# Patient Record
Sex: FEMALE | Race: WHITE | Employment: OTHER | URBAN - NONMETROPOLITAN AREA
[De-identification: names, ages, dates, MRNs, and addresses within clinical notes are randomized per-mention and may not be internally consistent; named-entity substitution may affect disease eponyms.]

---

## 2024-01-30 ENCOUNTER — HOSPITAL ENCOUNTER (OUTPATIENT)
Dept: MRI IMAGING | Age: 69
Discharge: HOME | End: 2024-01-30
Attending: RADIOLOGY

## 2024-01-30 DIAGNOSIS — Z00.6 ENCOUNTER FOR EXAMINATION FOR NORMAL COMPARISON OR CONTROL IN CLINICAL RESEARCH PROGRAM: ICD-10-CM

## 2024-05-10 ENCOUNTER — HOSPITAL ENCOUNTER (OUTPATIENT)
Dept: CT IMAGING | Age: 69
Discharge: HOME | End: 2024-05-10
Attending: RADIOLOGY

## 2024-05-10 DIAGNOSIS — Z00.6 ENCOUNTER FOR EXAMINATION FOR NORMAL COMPARISON OR CONTROL IN CLINICAL RESEARCH PROGRAM: ICD-10-CM

## 2024-05-11 ENCOUNTER — HOSPITAL ENCOUNTER (OUTPATIENT)
Dept: CT IMAGING | Age: 69
Discharge: HOME | End: 2024-05-11
Attending: RADIOLOGY

## 2024-05-11 ENCOUNTER — HOSPITAL ENCOUNTER (OUTPATIENT)
Dept: ULTRASOUND IMAGING | Age: 69
Discharge: HOME | End: 2024-05-11
Attending: RADIOLOGY

## 2024-05-11 DIAGNOSIS — Z00.6 ENCOUNTER FOR EXAMINATION FOR NORMAL COMPARISON OR CONTROL IN CLINICAL RESEARCH PROGRAM: ICD-10-CM

## 2024-05-15 ENCOUNTER — OFFICE VISIT (OUTPATIENT)
Dept: NEUROLOGY | Age: 69
End: 2024-05-15
Payer: MEDICARE

## 2024-05-15 ENCOUNTER — TELEPHONE (OUTPATIENT)
Dept: NEUROLOGY | Age: 69
End: 2024-05-15

## 2024-05-15 ENCOUNTER — OFFICE VISIT (OUTPATIENT)
Dept: NEUROSURGERY | Age: 69
End: 2024-05-15

## 2024-05-15 VITALS
SYSTOLIC BLOOD PRESSURE: 132 MMHG | WEIGHT: 129.8 LBS | HEIGHT: 63 IN | DIASTOLIC BLOOD PRESSURE: 70 MMHG | BODY MASS INDEX: 23 KG/M2

## 2024-05-15 VITALS
HEART RATE: 80 BPM | HEIGHT: 63 IN | DIASTOLIC BLOOD PRESSURE: 68 MMHG | WEIGHT: 129 LBS | SYSTOLIC BLOOD PRESSURE: 130 MMHG | BODY MASS INDEX: 22.86 KG/M2

## 2024-05-15 DIAGNOSIS — M48.02 CERVICAL SPINAL STENOSIS: ICD-10-CM

## 2024-05-15 DIAGNOSIS — I65.03 VERTEBRAL ARTERY STENOSIS, BILATERAL: Primary | ICD-10-CM

## 2024-05-15 DIAGNOSIS — M99.11: Primary | ICD-10-CM

## 2024-05-15 DIAGNOSIS — I65.23 BILATERAL CAROTID ARTERY STENOSIS: Primary | ICD-10-CM

## 2024-05-15 PROCEDURE — 99204 OFFICE O/P NEW MOD 45 MIN: CPT

## 2024-05-15 PROCEDURE — 1123F ACP DISCUSS/DSCN MKR DOCD: CPT

## 2024-05-15 RX ORDER — CARVEDILOL 3.12 MG/1
TABLET ORAL
COMMUNITY

## 2024-05-15 RX ORDER — ASPIRIN 81 MG/1
81 TABLET, CHEWABLE ORAL DAILY
COMMUNITY

## 2024-05-15 RX ORDER — CHLORTHALIDONE 25 MG/1
25 TABLET ORAL
COMMUNITY

## 2024-05-15 RX ORDER — ATORVASTATIN CALCIUM 80 MG/1
80 TABLET, FILM COATED ORAL DAILY
COMMUNITY
Start: 2024-03-28 | End: 2024-09-24

## 2024-05-15 ASSESSMENT — ENCOUNTER SYMPTOMS
CHEST TIGHTNESS: 0
APNEA: 0
SHORTNESS OF BREATH: 0

## 2024-05-15 NOTE — PROGRESS NOTES
[facility]'S NEUROSURGERY DEPARTMENT  [address] [place] [address]  [address]  [place] [id number]  Dept: [phone]  Dept Fax: [phone]      Patient Name:  Pamela Sesay  Visit Date:  5/15/2024    HPI:     Ms. Sesay is a 68 y.o. female that presents today at [facility] Neurosurgery for evaluation of the following: A referral to our service for evaluation of symptoms consistent with cervical spinal stenosis    Chief Complaint   Patient presents with    Consultation     Spinal Stenosis of Cervical        HPI   Mrs. Sesay is a pleasant, active 68-year-old female, an every day smoker tobacco a nonuser of smokeless tobacco or alcohol with a medical history significant for recent stroke related to CVA on 8/12/2023, anemia, arthritis, COPD, essential hypertension (benign), heart murmur, hyperlipidemia and a surgical history that includes carpal tunnel release (right), right shoulder surgery and ankle surgery but absent significant spine or brain surgery history.  She presents to our service as a referral for evaluation of symptoms consistent with cervical spinal stenosis.         Image highlights multiple areas of subluxation from C2-C5 with ligament thickening contributing to moderate to severe spinal canal central stenosis as well as foraminal stenosis at multiple levels.    Retrolisthesis of C3 on C4, C4 on C5 and C5 on C6.  Disc space narrowing and   osteophytes throughout the cervical region with stenosis of the thecal sac   and narrowing of the neural foramina as discussed in detail above.     She arrives to today's appointment unaccompanied and ambulating without assistance with a wrist splint noted for the left arm.  Of note: She is concurrently being treated by neurointerventional with surgical intervention to address arterial and venous stenosis considered as well as a specialist attempting to schedule left carpal tunnel release.    She relates worsening neck pain radiating to

## 2024-05-16 ASSESSMENT — ENCOUNTER SYMPTOMS
ABDOMINAL PAIN: 0
NAUSEA: 0
DIARRHEA: 0
TROUBLE SWALLOWING: 0
WHEEZING: 0
SORE THROAT: 0
VOMITING: 0
SHORTNESS OF BREATH: 0

## 2024-05-16 ASSESSMENT — VISUAL ACUITY: VA_NORMAL: 1

## 2024-05-16 NOTE — PROGRESS NOTES
office for any non-emergent questions or concerns.     This patient was seen and evaluated with Dr. Lange and he is in agreement with the assessment and plan.    Electronically signed by EVERETTE Mancini CNP on 5/16/24 at 11:24 AM EDT

## 2024-05-17 ENCOUNTER — TELEPHONE (OUTPATIENT)
Dept: NEUROLOGY | Age: 69
End: 2024-05-17

## 2024-05-17 NOTE — TELEPHONE ENCOUNTER
This patient had an appointment with Neurointervention 5/15/24. During that appointment, patient informed Renee Brito CNP that she is unsure if she was to be on Atorvastatin.     Per Renee Brito, lina RN called the patients primary care physicians office and spoke with Mireille to inform them of this and encourage them to follow up with the patient as to if she should be taking Atorvastatin and instructions with the medication.     Office note faxed to Reinaldo Corona CNP office for review.

## 2024-06-13 ENCOUNTER — HOSPITAL ENCOUNTER (OUTPATIENT)
Dept: CT IMAGING | Age: 69
Discharge: HOME | End: 2024-06-13
Payer: MEDICARE

## 2024-06-13 ENCOUNTER — HOSPITAL ENCOUNTER (OUTPATIENT)
Age: 69
Discharge: HOME | End: 2024-06-13
Payer: MEDICARE

## 2024-06-13 ENCOUNTER — HOSPITAL ENCOUNTER (OUTPATIENT)
Dept: GENERAL RADIOLOGY | Age: 69
Discharge: HOME | End: 2024-06-13
Payer: MEDICARE

## 2024-06-13 DIAGNOSIS — M99.11: ICD-10-CM

## 2024-06-13 PROCEDURE — 72040 X-RAY EXAM NECK SPINE 2-3 VW: CPT

## 2024-06-13 PROCEDURE — 72125 CT NECK SPINE W/O DYE: CPT

## 2024-06-17 ENCOUNTER — OFFICE VISIT (OUTPATIENT)
Dept: NEUROSURGERY | Age: 69
End: 2024-06-17
Payer: MEDICARE

## 2024-06-17 VITALS
SYSTOLIC BLOOD PRESSURE: 133 MMHG | HEART RATE: 69 BPM | WEIGHT: 128 LBS | HEIGHT: 63 IN | DIASTOLIC BLOOD PRESSURE: 64 MMHG | BODY MASS INDEX: 22.68 KG/M2

## 2024-06-17 DIAGNOSIS — M99.11: Primary | ICD-10-CM

## 2024-06-17 DIAGNOSIS — M48.02 CERVICAL SPINAL STENOSIS: ICD-10-CM

## 2024-06-17 DIAGNOSIS — Z51.81 ENCOUNTER FOR THERAPEUTIC DRUG MONITORING: ICD-10-CM

## 2024-06-17 PROCEDURE — 1123F ACP DISCUSS/DSCN MKR DOCD: CPT | Performed by: PHYSICIAN ASSISTANT

## 2024-06-17 PROCEDURE — 99214 OFFICE O/P EST MOD 30 MIN: CPT | Performed by: PHYSICIAN ASSISTANT

## 2024-06-17 NOTE — PROGRESS NOTES
cleared by our service to undergo that procedure without contraindication regardless of surgical planning by our service on her behalf elsewhere.  Discussions were revisited involving a surgical option, principally in the form of anterior cervical decompression and fusion, to address significant central canal stenosis and neuroforaminal narrowing at C 3 4, see 4 5, and C5-6.  We described the procedure in detail along with expectations for recovery and the associated risks which include, but not limited to; bleeding, infection, anesthetic complication, neurologic injury, incomplete relief of symptoms, the need for future fusion, dysphagia and hoarseness, and even death.  Understanding the risk associated with procedure she is anxious and amicable to move forward with a consent offered for signature and the surgery to be scheduled with Dr. Carroll/neurosurgeon, at the earliest convenience.  In advance of surgery scheduling an updated MRI of the cervical spine is ordered to rule out any additional or significant changes that may alter the plan.     Patient was evaluated today and is doing marginally overall.  No new complaints were voiced.  Patient  lives with their family  Wound: none  Follow-up Studies: No orders of the defined types were placed in this encounter.       Assessment/Plan:  Status Post cervical radiculopathy  Doing marginally overall  Encouraged gradual increase in physical and mental activity.  Fall precaution and home safety education provided to patient.  Follow-up: Anterior cervical decompression and fusion is consented with scheduling to follow including updated MRI of the cervical spine to rule out any significant changes and may alter the plan.  Patient is encouraged to keep and maintain appointments with other subspecialties including scheduling for carpal tunnel release anticipated with another service in the near future.  She and her family are encouraged in the interim to contact our office

## 2024-07-18 DIAGNOSIS — M48.02 CERVICAL SPINAL STENOSIS: ICD-10-CM

## 2024-07-18 DIAGNOSIS — Z51.81 ENCOUNTER FOR THERAPEUTIC DRUG MONITORING: ICD-10-CM

## 2024-07-18 DIAGNOSIS — M99.11: ICD-10-CM

## 2024-07-22 ENCOUNTER — HOSPITAL ENCOUNTER (OUTPATIENT)
Dept: MRI IMAGING | Age: 69
Discharge: HOME | End: 2024-07-22
Attending: RADIOLOGY

## 2024-07-22 DIAGNOSIS — Z00.6 EXAMINATION FOR NORMAL COMPARISON OR CONTROL IN CLINICAL RESEARCH: ICD-10-CM

## 2024-10-07 ENCOUNTER — TELEPHONE (OUTPATIENT)
Dept: NEUROSURGERY | Age: 69
End: 2024-10-07

## 2024-10-07 NOTE — TELEPHONE ENCOUNTER
SURGERY SCHEDULING FORM   [address] [address]      Phone *[phone]   Surgical Scheduling Direct Line Phone *[phone] Fax *[phone]      Pamela Sesay   1955   female    [address]  [place] [address]              Home Phone: [phone]    Cell Phone:    Telephone Information:   Mobile [phone]          Surgeon: Dr. Carroll   Surgery Date: 12/02/2024  Time: 7:30 am    Procedure: Anterior Cervical Decompression and fusion of C3-C4, C4-C5, C5-C6     Diagnosis:  Subluxation complex of cervical region, Cervical Spinal Stenosis     Important Medical History:       Special Inst/Equip: Position: Prone, Operating microscope, Xray/Fluoro, Neuromonitoring    Anesthesia:  Gen            Admission Type:  observation     Case Location:      Main OR         Need Preop Cardiac Clearance:       Does Patient have Cardiologist/physician?         Surgery Confirmation #: __________________________________________________    : ________________________   Date: __________________________     Insurance Company Name: [employer]  CPT CODES: 30358, 43853

## 2024-11-19 ENCOUNTER — TELEPHONE (OUTPATIENT)
Dept: NEUROSURGERY | Age: 69
End: 2024-11-19

## 2024-11-20 DIAGNOSIS — M48.02 CERVICAL SPINAL STENOSIS: Primary | ICD-10-CM

## 2024-11-20 DIAGNOSIS — Z51.81 THERAPEUTIC DRUG MONITORING: ICD-10-CM

## 2024-11-20 DIAGNOSIS — Z51.81 ENCOUNTER FOR THERAPEUTIC DRUG LEVEL MONITORING: ICD-10-CM

## 2024-11-20 DIAGNOSIS — Z01.812 ENCOUNTER FOR PRE-OPERATIVE LABORATORY TESTING: ICD-10-CM

## 2024-11-26 ENCOUNTER — TELEPHONE (OUTPATIENT)
Dept: ONCOLOGY | Age: 69
End: 2024-11-26

## 2024-11-26 NOTE — TELEPHONE ENCOUNTER
Patient called in to cancel appointment. States she is not feeling up to it today. She states that she will call office next month to reschedule.

## 2024-11-27 ENCOUNTER — TELEPHONE (OUTPATIENT)
Dept: NEUROSURGERY | Age: 69
End: 2024-11-27

## 2024-11-27 NOTE — TELEPHONE ENCOUNTER
Spoke with Melanie from [employer] regarding the denial for surgery, Stated pt is currently smoking and needs to have a negative nicotine test at least 6 weeks. And a note from PT that may state why it is contraindicated for patient to have physical therapy. May schedule a peer to peer on or before 11/29/24 @ [facility].   Ref #[id number]

## 2025-02-26 ENCOUNTER — TELEPHONE (OUTPATIENT)
Dept: NEUROSURGERY | Age: 70
End: 2025-02-26

## 2025-03-03 ENCOUNTER — OFFICE VISIT (OUTPATIENT)
Dept: NEUROSURGERY | Age: 70
End: 2025-03-03
Payer: MEDICARE

## 2025-03-03 VITALS
HEART RATE: 77 BPM | DIASTOLIC BLOOD PRESSURE: 80 MMHG | SYSTOLIC BLOOD PRESSURE: 180 MMHG | BODY MASS INDEX: 23.92 KG/M2 | WEIGHT: 135 LBS | HEIGHT: 63 IN

## 2025-03-03 DIAGNOSIS — G95.9 CERVICAL MYELOPATHY WITH CERVICAL RADICULOPATHY (HCC): Primary | ICD-10-CM

## 2025-03-03 DIAGNOSIS — M48.02 CERVICAL SPINAL STENOSIS: ICD-10-CM

## 2025-03-03 DIAGNOSIS — M54.12 CERVICAL MYELOPATHY WITH CERVICAL RADICULOPATHY (HCC): Primary | ICD-10-CM

## 2025-03-03 DIAGNOSIS — M99.11: ICD-10-CM

## 2025-03-03 PROCEDURE — 1159F MED LIST DOCD IN RCRD: CPT | Performed by: NURSE PRACTITIONER

## 2025-03-03 PROCEDURE — 1123F ACP DISCUSS/DSCN MKR DOCD: CPT | Performed by: NURSE PRACTITIONER

## 2025-03-03 PROCEDURE — 99214 OFFICE O/P EST MOD 30 MIN: CPT | Performed by: NURSE PRACTITIONER

## 2025-03-03 PROCEDURE — 1125F AMNT PAIN NOTED PAIN PRSNT: CPT | Performed by: NURSE PRACTITIONER

## 2025-03-03 RX ORDER — NITROGLYCERIN 0.4 MG/1
0.4 TABLET SUBLINGUAL
COMMUNITY
Start: 2024-11-11

## 2025-03-03 ASSESSMENT — ENCOUNTER SYMPTOMS
SHORTNESS OF BREATH: 0
TROUBLE SWALLOWING: 0
NAUSEA: 0
VOMITING: 0
WHEEZING: 0
VOICE CHANGE: 0
PHOTOPHOBIA: 0
COUGH: 0
ABDOMINAL DISTENTION: 0
CHEST TIGHTNESS: 0
BACK PAIN: 0
CHOKING: 0
COLOR CHANGE: 0

## 2025-03-03 NOTE — PROGRESS NOTES
[facility]'S NEUROSURGERY DEPARTMENT  [address] [place] [address]  [address]  [place] [id number]  Dept: [phone]  Dept Fax: [phone]      Patient Name:  Pamela Sesay  Visit Date:  3/3/2025    HPI:     Ms. Sesay is a 69 y.o. female in which she is a half a pack a day smoker and working in acknowledging smoking sensation to begin on 3/3/2025, she also does have a significant history of recent stroke related to CVA on 8/12/2023 and primarily left-sided weakness which has ultimately resolved aside from her cervical stenosis. That presents today at [facility] Neurosurgery for evaluation of the following: Pamela is a 69-year-old female who presents to the office with ongoing symptoms of cervical myelopathy with increasing balance difficulties along with dexterity and coordination changes.  She feels significant limited with her functional ability with completing activities of daily living.  She also notes feeling as though she falls due to her balance difficulties which is a significant concern to her on a day-to-day basis and feels ultimately limited with her mobility.  In addition to her myelopathic symptoms she does note radiating pain particularly in her bilateral shoulders right greater than left with associated weakness, with newer symptoms of muscle spasming in her left upper arm.  She did undergo a MRI of the cervical spine back in June 2024 which did show evidence of multilevel cervical stenosis with ventral cord contact to the levels of C3-C4, C4-C5 and C5-C6 with subluxation antegrade of C4 relative to C5 and C5 relative to C6.  She notes her symptoms have progressively worsened and involving more of her tricep and deltoid weakness and her bilateral shoulder range of motion since June 2024.  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  Her symptoms are consistent with findings on a previous MRI of

## 2025-03-27 ENCOUNTER — HOSPITAL ENCOUNTER (OUTPATIENT)
Dept: GENERAL RADIOLOGY | Age: 70
Discharge: HOME | End: 2025-03-27

## 2025-03-27 ENCOUNTER — HOSPITAL ENCOUNTER (OUTPATIENT)
Dept: MRI IMAGING | Age: 70
Discharge: HOME | End: 2025-03-27
Attending: RADIOLOGY

## 2025-03-27 DIAGNOSIS — Z00.6 ENCOUNTER FOR EXAMINATION FOR NORMAL COMPARISON OR CONTROL IN CLINICAL RESEARCH PROGRAM: ICD-10-CM

## 2025-04-16 ENCOUNTER — OFFICE VISIT (OUTPATIENT)
Dept: NEUROSURGERY | Age: 70
End: 2025-04-16
Payer: MEDICARE

## 2025-04-16 VITALS
WEIGHT: 135 LBS | SYSTOLIC BLOOD PRESSURE: 155 MMHG | BODY MASS INDEX: 23.92 KG/M2 | DIASTOLIC BLOOD PRESSURE: 72 MMHG | HEART RATE: 60 BPM | HEIGHT: 63 IN

## 2025-04-16 DIAGNOSIS — G99.2 STENOSIS OF CERVICAL SPINE WITH MYELOPATHY (HCC): Primary | ICD-10-CM

## 2025-04-16 DIAGNOSIS — M48.02 CERVICAL SPINAL STENOSIS: ICD-10-CM

## 2025-04-16 DIAGNOSIS — M99.11: ICD-10-CM

## 2025-04-16 DIAGNOSIS — Z51.81 THERAPEUTIC DRUG MONITORING: ICD-10-CM

## 2025-04-16 DIAGNOSIS — M48.02 STENOSIS OF CERVICAL SPINE WITH MYELOPATHY (HCC): Primary | ICD-10-CM

## 2025-04-16 DIAGNOSIS — Z01.812 ENCOUNTER FOR PRE-OPERATIVE LABORATORY TESTING: ICD-10-CM

## 2025-04-16 PROCEDURE — 1125F AMNT PAIN NOTED PAIN PRSNT: CPT | Performed by: NURSE PRACTITIONER

## 2025-04-16 PROCEDURE — 1159F MED LIST DOCD IN RCRD: CPT | Performed by: NURSE PRACTITIONER

## 2025-04-16 PROCEDURE — 99214 OFFICE O/P EST MOD 30 MIN: CPT | Performed by: NURSE PRACTITIONER

## 2025-04-16 PROCEDURE — 1123F ACP DISCUSS/DSCN MKR DOCD: CPT | Performed by: NURSE PRACTITIONER

## 2025-04-16 ASSESSMENT — ENCOUNTER SYMPTOMS
CHEST TIGHTNESS: 0
BACK PAIN: 0
ABDOMINAL DISTENTION: 0
SHORTNESS OF BREATH: 0
VOICE CHANGE: 0
COLOR CHANGE: 0
PHOTOPHOBIA: 0
WHEEZING: 0
NAUSEA: 0
CHOKING: 0
VOMITING: 0
COUGH: 0
TROUBLE SWALLOWING: 0

## 2025-04-16 NOTE — PROGRESS NOTES
[facility]'S NEUROSURGERY DEPARTMENT  [address] [place] [address]  [address]  [place] [id number]  Dept: [phone]  Dept Fax: [phone]      Patient Name:  Pamela Sesay  Visit Date:  4/16/2025    HPI:     Ms. Sesya is a 69 y.o. female in which she is a half a pack a day smoker and working in acknowledging smoking sensation to begin on 3/3/2025, she also does have a significant history of recent stroke related to CVA on 8/12/2023 and primarily left-sided weakness which has ultimately resolved aside from her cervical stenosis. That presents today at [facility] Neurosurgery for evaluation of the following: Pamela is a 69-year-old female who presents to the office with ongoing symptoms of cervical myelopathy with increasing balance difficulties along with dexterity and coordination changes.  She feels significant limited with her functional ability with completing activities of daily living.  She also notes feeling as though she falls due to her balance difficulties which is a significant concern to her on a day-to-day basis and feels ultimately limited with her mobility.  In addition to her myelopathic symptoms she does note radiating pain particularly in her bilateral shoulders right greater than left with associated weakness, with newer symptoms of muscle spasming in her left upper arm.  She did undergo a MRI of the cervical spine back in June 2024 which did show evidence of multilevel cervical stenosis with ventral cord contact to the levels of C3-C4, C4-C5 and C5-C6 with subluxation antegrade of C4 relative to C5 and C5 relative to C6.  She notes her symptoms have progressively worsened and involving more of her tricep and deltoid weakness and her bilateral shoulder range of motion since June 2024.  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  Her symptoms are consistent with findings on a previous MRI of

## 2025-04-24 ENCOUNTER — TELEPHONE (OUTPATIENT)
Dept: NEUROSURGERY | Age: 70
End: 2025-04-24

## 2025-05-06 ENCOUNTER — PREP FOR PROCEDURE (OUTPATIENT)
Dept: NEUROSURGERY | Age: 70
End: 2025-05-06

## 2025-05-06 DIAGNOSIS — M48.02 CERVICAL SPINAL STENOSIS: ICD-10-CM

## 2025-05-06 DIAGNOSIS — M99.11 VERTEBRAL SUBLUXATION COMPLEX OF CERVICAL REGION: ICD-10-CM

## 2025-05-13 ENCOUNTER — APPOINTMENT (OUTPATIENT)
Dept: CT IMAGING | Age: 70
End: 2025-05-13
Payer: MEDICARE

## 2025-05-13 ENCOUNTER — HOSPITAL ENCOUNTER (OUTPATIENT)
Dept: CT IMAGING | Age: 70
Discharge: HOME | End: 2025-05-13
Payer: MEDICARE

## 2025-05-13 DIAGNOSIS — I65.23 BILATERAL CAROTID ARTERY STENOSIS: ICD-10-CM

## 2025-05-13 PROCEDURE — 70496 CT ANGIOGRAPHY HEAD: CPT

## 2025-05-13 PROCEDURE — 70498 CT ANGIOGRAPHY NECK: CPT

## 2025-05-13 PROCEDURE — 6360000004 HC RX CONTRAST MEDICATION

## 2025-05-13 RX ORDER — IOPAMIDOL 755 MG/ML
80 INJECTION, SOLUTION INTRAVASCULAR
Status: COMPLETED | OUTPATIENT
Start: 2025-05-13 | End: 2025-05-13

## 2025-05-13 RX ADMIN — IOPAMIDOL 80 ML: 755 INJECTION, SOLUTION INTRAVENOUS at 13:04

## 2025-05-23 RX ORDER — ALBUTEROL SULFATE 90 UG/1
2 INHALANT RESPIRATORY (INHALATION) EVERY 6 HOURS PRN
COMMUNITY

## 2025-05-23 NOTE — PROGRESS NOTES
Follow all instructions given by your physician  Sips of water am of surgery with allowed medications  May brush teeth   Do not smoke or chew tobacco, drink alcoholic beverages or use any illicit drugs for 24 hours prior to surgery  Bring insurance info and photo ID  Bring pertinent paperwork with you from Doctor or surgeons's office  Wear clean comfortable, loose-fitting clothing  No make-up, nail polish, jewelry, piercings, or contact lenses to be worn day of surgery  No glue on dentures morning of surgery; you will be asked to remove them for surgery. Case for glasses.  Shower the night before and the morning of surgery with cleansing soap provided or a liquid antibacterial soap, dry with new fresh clean towel after each shower, no lotions, creams or powder.  Clean sheets and pillowcase on bed night before surgery  Bring rescue inhalers with you    Do you have a DNR? YES  Please Bring Healthcare Directive or Healthcare Power of  in so we can scan it into your chart.    Our pharmacy has a Meds to Beds program where they will deliver any new prescriptions you may have to your room before you leave. Our Pharmacy will clear it through your insurance; for example (same co pay). This enables you to take your new RX as soon as you need when you get home and avoids stop/wait delays on the way home.  Please have a form of payment with you and have someone designated as your Pharmacy contact with their phone # as you may not feel well or still be under the influence of anesthesia.    Please refer to the SSI-Surgical Site Infection Flyer you hopefully received in the mail-together we can prevent infections; signs and symptoms reviewed.  When discharged be sure you understand how to care for your wound and that you have the Dr./office phone # to call if you have any concerns or questions about your wound.     needed at discharge and someone over 18 to stay with you for 24 hours overnight (surgery may be

## 2025-05-30 ENCOUNTER — TELEPHONE (OUTPATIENT)
Dept: NEUROSURGERY | Age: 70
End: 2025-05-30

## 2025-05-30 NOTE — TELEPHONE ENCOUNTER
Submitted for prior auth for inpatient code 89997 for DX code M48.02, G99.2. for service date 6/2/25. Ref #[id number]. KL

## 2025-06-01 NOTE — PROGRESS NOTES
Patient was seen and examined in the pre op area in conjunction with ( neurosurgery PAKenneth and Neurosurgery NP, Ginny).    This is a 70-year-old female who has a history of a progressive severe neck pain started 2023.  Patient symptoms have been progressively gotten worse with time.  Currently patient has a pain that up to 8-9/10.  The pain goes to her left arm all the way down to the fingers.  The pain increases with neck movements  Patient has right-sided weakness in her baseline from previous stroke about 4-/5.  Patient had several months for treatment modalities including cervical spine injection but without significant help.  Patient neurological exam was significant for severe cervical spine restriction in all directions. Has general weakness in her right arm 4-/5 grossly .    patient underwent cervical spine imaging studies that were significant for:        1- Cervical spondylosis/degenerative changes at multiple levels especially at the levels of C3-C4, C4-C5 and C5-C6  2- Cervical spine stenosis at the levels of C3-C4 , C4-C5 and C5-C6  3-Cervical spine deformity (losing of the normal lordosis).  4-Cervical spine the disc disease at the levels C3-C4, C4-C5 and C5-C6  5-Cervical spine foraminal stenosis at the level of C3-C4 , C4-C5 and C5-C6                     Decision making:        -Given patient's symptoms, the duration of her symptoms, the response of his symptoms for conservative treatment modalities, the findings of his neurological exam and the findings of his cervical spine imaging studies, I recommended for patient surgical intervention mainly in the form of :     \"Anterior cervical spine decompression and fusion (ACDF) at the levels of C3-C4, C4-C5 and C5-C6\"     -The recommended surgical intervention was discussed in details with the patient and her family as well as the alternative treatment options (such as spinal injections), as well as the associated risks and benefits.  - I discussed

## 2025-06-01 NOTE — H&P
Pamela Sesay is an 70 y.o.  female, she is an everyday smoker tobacco with 1/2 pack 25-pack-year history who denies smokeless tobacco or vaping product use and denies current alcohol consumption with a medical history that includes anxiety, arthritis, asthma, coronary artery disease requiring anticoagulation with aspirin, prior uterine cancer dating to 2021, cerebral artery occlusion with infarction dating to 2023, COPD, hyperlipidemia and hypertension, nerve pain, and a surgical history that includes prior carpal tunnel release on the left as well as elbow and fracture surgery for the clavicle.  She presented to our service with complaints of worsening neck pain along with left upper extremity weakness significantly limiting activity related to daily living.  She relates frequent falls due to worsening balance with myelopathic symptoms along with associated weakness and muscle spasm in her left upper extremity.  MRI reveals multilevel cervical stenosis from C3-C6 with subluxation antegrade of C4 relative to C5 C5 relative to C6.    Past Medical History:   Diagnosis Date    Anxiety     Arthritis     Asthma     CAD (coronary artery disease)     Cancer ([facility]) 2021    Uterine    Cerebral artery occlusion with cerebral infarction ([facility]) 08/12/2023    left side affected    COPD (chronic obstructive pulmonary disease) ([facility])     Hyperlipidemia     Hypertension     Nerve pain        Allergies:   Allergies   Allergen Reactions    Morphine Itching, Nausea And Vomiting and Rash     Other Reaction(s): Hives (Severe)    Penicillin G Swelling    Oxycodone-Acetaminophen      Other Reaction(s): Rash, sick       Active Problems:    Cervical spinal stenosis    Vertebral subluxation complex of cervical region  Resolved Problems:    * No resolved hospital problems. *    Height 1.6 m (5' 3\"), weight 61.2 kg (135 lb).    Review of Systems    Constitutional:  Negative for chills, fatigue and fever.   HENT:  Negative for hearing

## 2025-06-02 ENCOUNTER — HOSPITAL ENCOUNTER (INPATIENT)
Age: 70
LOS: 1 days | Discharge: HOME | DRG: 472 | End: 2025-06-03
Attending: NEUROLOGICAL SURGERY | Admitting: NEUROLOGICAL SURGERY
Payer: MEDICARE

## 2025-06-02 ENCOUNTER — ANESTHESIA EVENT (OUTPATIENT)
Dept: OPERATING ROOM | Age: 70
DRG: 472 | End: 2025-06-02
Payer: MEDICARE

## 2025-06-02 ENCOUNTER — APPOINTMENT (OUTPATIENT)
Dept: GENERAL RADIOLOGY | Age: 70
DRG: 472 | End: 2025-06-02
Attending: NEUROLOGICAL SURGERY
Payer: MEDICARE

## 2025-06-02 ENCOUNTER — ANESTHESIA (OUTPATIENT)
Dept: OPERATING ROOM | Age: 70
DRG: 472 | End: 2025-06-02
Payer: MEDICARE

## 2025-06-02 DIAGNOSIS — Z98.1 STATUS POST CERVICAL SPINAL FUSION: Primary | ICD-10-CM

## 2025-06-02 LAB
ABO GROUP BLD: NORMAL
IAT IGG-SP REAG SERPL QL: NORMAL
RH BLD: NORMAL

## 2025-06-02 PROCEDURE — 94760 N-INVAS EAR/PLS OXIMETRY 1: CPT

## 2025-06-02 PROCEDURE — C1713 ANCHOR/SCREW BN/BN,TIS/BN: HCPCS | Performed by: NEUROLOGICAL SURGERY

## 2025-06-02 PROCEDURE — C1889 IMPLANT/INSERT DEVICE, NOC: HCPCS | Performed by: NEUROLOGICAL SURGERY

## 2025-06-02 PROCEDURE — 22853 INSJ BIOMECHANICAL DEVICE: CPT | Performed by: PHYSICIAN ASSISTANT

## 2025-06-02 PROCEDURE — 86900 BLOOD TYPING SEROLOGIC ABO: CPT

## 2025-06-02 PROCEDURE — 3700000000 HC ANESTHESIA ATTENDED CARE: Performed by: NEUROLOGICAL SURGERY

## 2025-06-02 PROCEDURE — 6370000000 HC RX 637 (ALT 250 FOR IP): Performed by: NURSE ANESTHETIST, CERTIFIED REGISTERED

## 2025-06-02 PROCEDURE — 22853 INSJ BIOMECHANICAL DEVICE: CPT | Performed by: NEUROLOGICAL SURGERY

## 2025-06-02 PROCEDURE — 86901 BLOOD TYPING SEROLOGIC RH(D): CPT

## 2025-06-02 PROCEDURE — 0RB30ZZ EXCISION OF CERVICAL VERTEBRAL DISC, OPEN APPROACH: ICD-10-PCS | Performed by: NEUROLOGICAL SURGERY

## 2025-06-02 PROCEDURE — 7100000001 HC PACU RECOVERY - ADDTL 15 MIN: Performed by: NEUROLOGICAL SURGERY

## 2025-06-02 PROCEDURE — 3600000004 HC SURGERY LEVEL 4 BASE: Performed by: NEUROLOGICAL SURGERY

## 2025-06-02 PROCEDURE — 6360000002 HC RX W HCPCS: Performed by: NURSE ANESTHETIST, CERTIFIED REGISTERED

## 2025-06-02 PROCEDURE — 3600000014 HC SURGERY LEVEL 4 ADDTL 15MIN: Performed by: NEUROLOGICAL SURGERY

## 2025-06-02 PROCEDURE — 86885 COOMBS TEST INDIRECT QUAL: CPT

## 2025-06-02 PROCEDURE — 2500000003 HC RX 250 WO HCPCS: Performed by: PHYSICIAN ASSISTANT

## 2025-06-02 PROCEDURE — 2060000000 HC ICU INTERMEDIATE R&B

## 2025-06-02 PROCEDURE — 6360000002 HC RX W HCPCS: Performed by: PHYSICIAN ASSISTANT

## 2025-06-02 PROCEDURE — 0RG20A0 FUSION OF 2 OR MORE CERVICAL VERTEBRAL JOINTS WITH INTERBODY FUSION DEVICE, ANTERIOR APPROACH, ANTERIOR COLUMN, OPEN APPROACH: ICD-10-PCS | Performed by: NEUROLOGICAL SURGERY

## 2025-06-02 PROCEDURE — 01N10ZZ RELEASE CERVICAL NERVE, OPEN APPROACH: ICD-10-PCS | Performed by: NEUROLOGICAL SURGERY

## 2025-06-02 PROCEDURE — 22551 ARTHRD ANT NTRBDY CERVICAL: CPT | Performed by: PHYSICIAN ASSISTANT

## 2025-06-02 PROCEDURE — 2580000003 HC RX 258: Performed by: NURSE ANESTHETIST, CERTIFIED REGISTERED

## 2025-06-02 PROCEDURE — 6370000000 HC RX 637 (ALT 250 FOR IP): Performed by: ANESTHESIOLOGY

## 2025-06-02 PROCEDURE — 00NW0ZZ RELEASE CERVICAL SPINAL CORD, OPEN APPROACH: ICD-10-PCS | Performed by: NEUROLOGICAL SURGERY

## 2025-06-02 PROCEDURE — 94640 AIRWAY INHALATION TREATMENT: CPT

## 2025-06-02 PROCEDURE — 22552 ARTHRD ANT NTRBD CERVICAL EA: CPT | Performed by: NEUROLOGICAL SURGERY

## 2025-06-02 PROCEDURE — 22552 ARTHRD ANT NTRBD CERVICAL EA: CPT | Performed by: PHYSICIAN ASSISTANT

## 2025-06-02 PROCEDURE — 2720000010 HC SURG SUPPLY STERILE: Performed by: NEUROLOGICAL SURGERY

## 2025-06-02 PROCEDURE — L0120 CERV FLEX N/ADJ FOAM PRE OTS: HCPCS | Performed by: NEUROLOGICAL SURGERY

## 2025-06-02 PROCEDURE — 22846 INSERT SPINE FIXATION DEVICE: CPT | Performed by: NEUROLOGICAL SURGERY

## 2025-06-02 PROCEDURE — 7100000000 HC PACU RECOVERY - FIRST 15 MIN: Performed by: NEUROLOGICAL SURGERY

## 2025-06-02 PROCEDURE — 3700000001 HC ADD 15 MINUTES (ANESTHESIA): Performed by: NEUROLOGICAL SURGERY

## 2025-06-02 PROCEDURE — 22551 ARTHRD ANT NTRBDY CERVICAL: CPT | Performed by: NEUROLOGICAL SURGERY

## 2025-06-02 PROCEDURE — 2709999900 HC NON-CHARGEABLE SUPPLY: Performed by: NEUROLOGICAL SURGERY

## 2025-06-02 PROCEDURE — C1730 CATH, EP, 19 OR FEW ELECT: HCPCS | Performed by: NEUROLOGICAL SURGERY

## 2025-06-02 PROCEDURE — 36415 COLL VENOUS BLD VENIPUNCTURE: CPT

## 2025-06-02 PROCEDURE — 22846 INSERT SPINE FIXATION DEVICE: CPT | Performed by: PHYSICIAN ASSISTANT

## 2025-06-02 PROCEDURE — 72040 X-RAY EXAM NECK SPINE 2-3 VW: CPT

## 2025-06-02 PROCEDURE — P9045 ALBUMIN (HUMAN), 5%, 250 ML: HCPCS | Performed by: NURSE ANESTHETIST, CERTIFIED REGISTERED

## 2025-06-02 PROCEDURE — 2580000003 HC RX 258: Performed by: PHYSICIAN ASSISTANT

## 2025-06-02 PROCEDURE — 2500000003 HC RX 250 WO HCPCS: Performed by: NURSE ANESTHETIST, CERTIFIED REGISTERED

## 2025-06-02 PROCEDURE — 6370000000 HC RX 637 (ALT 250 FOR IP): Performed by: PHYSICIAN ASSISTANT

## 2025-06-02 DEVICE — INTERBODY, 16X14X6MM, 7 DEGREE, STERILE
Type: IMPLANTABLE DEVICE | Site: NECK | Status: FUNCTIONAL
Brand: SHORELINE ACS

## 2025-06-02 DEVICE — 6MM, LOCKING COVER
Type: IMPLANTABLE DEVICE | Site: NECK | Status: FUNCTIONAL
Brand: SHORELINE ACS

## 2025-06-02 DEVICE — 6MM, 3-HOLE ANTERIOR PLATE
Type: IMPLANTABLE DEVICE | Site: NECK | Status: FUNCTIONAL
Brand: SHORELINE ACS

## 2025-06-02 DEVICE — INTERBODY, 16X14X5MM, 7 DEGREE, STERILE
Type: IMPLANTABLE DEVICE | Site: NECK | Status: FUNCTIONAL
Brand: SHORELINE ACS

## 2025-06-02 DEVICE — 5MM, LOCKING COVER
Type: IMPLANTABLE DEVICE | Site: NECK | Status: FUNCTIONAL
Brand: SHORELINE ACS

## 2025-06-02 DEVICE — 3.5MM VARIABLE SCREW, SELF DRILLING, 14MM
Type: IMPLANTABLE DEVICE | Site: NECK | Status: FUNCTIONAL
Brand: SHORELINE ACS

## 2025-06-02 DEVICE — 5MM, 3-HOLE ANTERIOR PLATE
Type: IMPLANTABLE DEVICE | Site: NECK | Status: FUNCTIONAL
Brand: SHORELINE ACS

## 2025-06-02 DEVICE — GRAFT BNE 5 CC CELLULAR BNE MTRX OSSEOGEN: Type: IMPLANTABLE DEVICE | Site: NECK | Status: FUNCTIONAL

## 2025-06-02 RX ORDER — TRANEXAMIC ACID 100 MG/ML
INJECTION, SOLUTION INTRAVENOUS
Status: DISCONTINUED | OUTPATIENT
Start: 2025-06-02 | End: 2025-06-02 | Stop reason: SDUPTHER

## 2025-06-02 RX ORDER — SODIUM CHLORIDE 0.9 % (FLUSH) 0.9 %
5-40 SYRINGE (ML) INJECTION PRN
Status: DISCONTINUED | OUTPATIENT
Start: 2025-06-02 | End: 2025-06-03 | Stop reason: HOSPADM

## 2025-06-02 RX ORDER — ROCURONIUM BROMIDE 10 MG/ML
INJECTION, SOLUTION INTRAVENOUS
Status: DISCONTINUED | OUTPATIENT
Start: 2025-06-02 | End: 2025-06-02 | Stop reason: SDUPTHER

## 2025-06-02 RX ORDER — HYDROCODONE BITARTRATE AND ACETAMINOPHEN 5; 325 MG/1; MG/1
1 TABLET ORAL EVERY 6 HOURS PRN
Status: DISCONTINUED | OUTPATIENT
Start: 2025-06-02 | End: 2025-06-03 | Stop reason: HOSPADM

## 2025-06-02 RX ORDER — LIDOCAINE HYDROCHLORIDE 20 MG/ML
INJECTION, SOLUTION INTRAVENOUS
Status: DISCONTINUED | OUTPATIENT
Start: 2025-06-02 | End: 2025-06-02 | Stop reason: SDUPTHER

## 2025-06-02 RX ORDER — SODIUM CHLORIDE 9 MG/ML
INJECTION, SOLUTION INTRAVENOUS PRN
Status: DISCONTINUED | OUTPATIENT
Start: 2025-06-02 | End: 2025-06-03 | Stop reason: HOSPADM

## 2025-06-02 RX ORDER — CARVEDILOL 3.12 MG/1
3.12 TABLET ORAL 2 TIMES DAILY
Status: DISCONTINUED | OUTPATIENT
Start: 2025-06-02 | End: 2025-06-03 | Stop reason: HOSPADM

## 2025-06-02 RX ORDER — IPRATROPIUM BROMIDE AND ALBUTEROL SULFATE 2.5; .5 MG/3ML; MG/3ML
1 SOLUTION RESPIRATORY (INHALATION) ONCE
Status: COMPLETED | OUTPATIENT
Start: 2025-06-02 | End: 2025-06-02

## 2025-06-02 RX ORDER — HYDROMORPHONE HYDROCHLORIDE 2 MG/ML
INJECTION, SOLUTION INTRAMUSCULAR; INTRAVENOUS; SUBCUTANEOUS
Status: DISCONTINUED | OUTPATIENT
Start: 2025-06-02 | End: 2025-06-02 | Stop reason: SDUPTHER

## 2025-06-02 RX ORDER — NITROGLYCERIN 0.4 MG/1
0.4 TABLET SUBLINGUAL EVERY 5 MIN PRN
Status: DISCONTINUED | OUTPATIENT
Start: 2025-06-02 | End: 2025-06-03 | Stop reason: HOSPADM

## 2025-06-02 RX ORDER — ONDANSETRON 2 MG/ML
INJECTION INTRAMUSCULAR; INTRAVENOUS
Status: DISCONTINUED | OUTPATIENT
Start: 2025-06-02 | End: 2025-06-02 | Stop reason: SDUPTHER

## 2025-06-02 RX ORDER — SODIUM CHLORIDE 9 MG/ML
INJECTION, SOLUTION INTRAVENOUS
Status: DISCONTINUED | OUTPATIENT
Start: 2025-06-02 | End: 2025-06-02 | Stop reason: SDUPTHER

## 2025-06-02 RX ORDER — LIDOCAINE HYDROCHLORIDE 40 MG/ML
SOLUTION TOPICAL
Status: DISCONTINUED | OUTPATIENT
Start: 2025-06-02 | End: 2025-06-02 | Stop reason: SDUPTHER

## 2025-06-02 RX ORDER — EPHEDRINE SULFATE/0.9% NACL/PF 25 MG/5 ML
SYRINGE (ML) INTRAVENOUS
Status: DISCONTINUED | OUTPATIENT
Start: 2025-06-02 | End: 2025-06-02 | Stop reason: SDUPTHER

## 2025-06-02 RX ORDER — SODIUM CHLORIDE 0.9 % (FLUSH) 0.9 %
5-40 SYRINGE (ML) INJECTION EVERY 12 HOURS SCHEDULED
Status: DISCONTINUED | OUTPATIENT
Start: 2025-06-02 | End: 2025-06-03 | Stop reason: HOSPADM

## 2025-06-02 RX ORDER — FENTANYL CITRATE 50 UG/ML
INJECTION, SOLUTION INTRAMUSCULAR; INTRAVENOUS
Status: DISCONTINUED | OUTPATIENT
Start: 2025-06-02 | End: 2025-06-02 | Stop reason: SDUPTHER

## 2025-06-02 RX ORDER — POTASSIUM CHLORIDE 1500 MG/1
20 TABLET, EXTENDED RELEASE ORAL DAILY
Status: DISCONTINUED | OUTPATIENT
Start: 2025-06-02 | End: 2025-06-03 | Stop reason: HOSPADM

## 2025-06-02 RX ORDER — KETOROLAC TROMETHAMINE 30 MG/ML
15 INJECTION, SOLUTION INTRAMUSCULAR; INTRAVENOUS EVERY 6 HOURS PRN
Status: DISCONTINUED | OUTPATIENT
Start: 2025-06-02 | End: 2025-06-03 | Stop reason: HOSPADM

## 2025-06-02 RX ORDER — ALBUMIN HUMAN 50 G/1000ML
SOLUTION INTRAVENOUS
Status: DISCONTINUED | OUTPATIENT
Start: 2025-06-02 | End: 2025-06-02 | Stop reason: SDUPTHER

## 2025-06-02 RX ORDER — DEXAMETHASONE SODIUM PHOSPHATE 4 MG/ML
INJECTION, SOLUTION INTRA-ARTICULAR; INTRALESIONAL; INTRAMUSCULAR; INTRAVENOUS; SOFT TISSUE
Status: DISCONTINUED | OUTPATIENT
Start: 2025-06-02 | End: 2025-06-02 | Stop reason: SDUPTHER

## 2025-06-02 RX ORDER — MIDAZOLAM HYDROCHLORIDE 1 MG/ML
INJECTION, SOLUTION INTRAMUSCULAR; INTRAVENOUS
Status: DISCONTINUED | OUTPATIENT
Start: 2025-06-02 | End: 2025-06-02 | Stop reason: SDUPTHER

## 2025-06-02 RX ORDER — MINERAL OIL AND WHITE PETROLATUM 150; 830 MG/G; MG/G
OINTMENT OPHTHALMIC
Status: DISCONTINUED | OUTPATIENT
Start: 2025-06-02 | End: 2025-06-02 | Stop reason: SDUPTHER

## 2025-06-02 RX ORDER — ALBUTEROL SULFATE 90 UG/1
2 INHALANT RESPIRATORY (INHALATION) EVERY 6 HOURS PRN
Status: DISCONTINUED | OUTPATIENT
Start: 2025-06-02 | End: 2025-06-03 | Stop reason: HOSPADM

## 2025-06-02 RX ORDER — SODIUM CHLORIDE 0.9 % (FLUSH) 0.9 %
5-40 SYRINGE (ML) INJECTION EVERY 12 HOURS SCHEDULED
Status: DISCONTINUED | OUTPATIENT
Start: 2025-06-02 | End: 2025-06-02 | Stop reason: HOSPADM

## 2025-06-02 RX ORDER — SODIUM CHLORIDE 0.9 % (FLUSH) 0.9 %
5-40 SYRINGE (ML) INJECTION PRN
Status: DISCONTINUED | OUTPATIENT
Start: 2025-06-02 | End: 2025-06-02 | Stop reason: HOSPADM

## 2025-06-02 RX ORDER — CHLORTHALIDONE 25 MG/1
25 TABLET ORAL
Status: DISCONTINUED | OUTPATIENT
Start: 2025-06-02 | End: 2025-06-03 | Stop reason: HOSPADM

## 2025-06-02 RX ORDER — SODIUM CHLORIDE 9 MG/ML
INJECTION, SOLUTION INTRAVENOUS PRN
Status: DISCONTINUED | OUTPATIENT
Start: 2025-06-02 | End: 2025-06-02 | Stop reason: HOSPADM

## 2025-06-02 RX ORDER — PROPOFOL 10 MG/ML
INJECTION, EMULSION INTRAVENOUS
Status: DISCONTINUED | OUTPATIENT
Start: 2025-06-02 | End: 2025-06-02 | Stop reason: SDUPTHER

## 2025-06-02 RX ADMIN — LIDOCAINE HYDROCHLORIDE 4 ML: 40 SOLUTION TOPICAL at 07:43

## 2025-06-02 RX ADMIN — EPHEDRINE SULFATE 10 MG: 5 INJECTION INTRAVENOUS at 07:59

## 2025-06-02 RX ADMIN — HYDROMORPHONE HYDROCHLORIDE 0.4 MG: 2 INJECTION INTRAMUSCULAR; INTRAVENOUS; SUBCUTANEOUS at 08:54

## 2025-06-02 RX ADMIN — ALBUMIN (HUMAN) 12.5 G: 12.5 INJECTION, SOLUTION INTRAVENOUS at 09:20

## 2025-06-02 RX ADMIN — SODIUM CHLORIDE: 9 INJECTION, SOLUTION INTRAVENOUS at 08:35

## 2025-06-02 RX ADMIN — EPHEDRINE SULFATE 10 MG: 5 INJECTION INTRAVENOUS at 07:55

## 2025-06-02 RX ADMIN — KETOROLAC TROMETHAMINE 15 MG: 30 INJECTION, SOLUTION INTRAMUSCULAR at 16:21

## 2025-06-02 RX ADMIN — DEXAMETHASONE SODIUM PHOSPHATE 8 MG: 4 INJECTION, SOLUTION INTRAMUSCULAR; INTRAVENOUS at 08:40

## 2025-06-02 RX ADMIN — SUGAMMADEX 200 MG: 100 INJECTION, SOLUTION INTRAVENOUS at 10:35

## 2025-06-02 RX ADMIN — CHLORTHALIDONE 25 MG: 25 TABLET ORAL at 19:46

## 2025-06-02 RX ADMIN — HYDROMORPHONE HYDROCHLORIDE 0.4 MG: 2 INJECTION INTRAMUSCULAR; INTRAVENOUS; SUBCUTANEOUS at 09:38

## 2025-06-02 RX ADMIN — SODIUM CHLORIDE: 0.9 INJECTION, SOLUTION INTRAVENOUS at 06:31

## 2025-06-02 RX ADMIN — ONDANSETRON 4 MG: 2 INJECTION INTRAMUSCULAR; INTRAVENOUS at 08:40

## 2025-06-02 RX ADMIN — WATER 2000 MG: 1 INJECTION INTRAMUSCULAR; INTRAVENOUS; SUBCUTANEOUS at 07:35

## 2025-06-02 RX ADMIN — HYDROCODONE BITARTRATE AND ACETAMINOPHEN 1 TABLET: 5; 325 TABLET ORAL at 19:40

## 2025-06-02 RX ADMIN — IPRATROPIUM BROMIDE AND ALBUTEROL SULFATE 1 DOSE: .5; 3 SOLUTION RESPIRATORY (INHALATION) at 06:48

## 2025-06-02 RX ADMIN — FENTANYL CITRATE 100 MCG: 50 INJECTION, SOLUTION INTRAMUSCULAR; INTRAVENOUS at 07:39

## 2025-06-02 RX ADMIN — HYDROMORPHONE HYDROCHLORIDE 0.4 MG: 2 INJECTION INTRAMUSCULAR; INTRAVENOUS; SUBCUTANEOUS at 09:47

## 2025-06-02 RX ADMIN — PROPOFOL 150 MG: 10 INJECTION, EMULSION INTRAVENOUS at 07:41

## 2025-06-02 RX ADMIN — HYDROMORPHONE HYDROCHLORIDE 0.4 MG: 2 INJECTION INTRAMUSCULAR; INTRAVENOUS; SUBCUTANEOUS at 09:56

## 2025-06-02 RX ADMIN — MIDAZOLAM 2 MG: 1 INJECTION INTRAMUSCULAR; INTRAVENOUS at 07:36

## 2025-06-02 RX ADMIN — CARVEDILOL 3.12 MG: 3.12 TABLET, FILM COATED ORAL at 19:46

## 2025-06-02 RX ADMIN — HYDROMORPHONE HYDROCHLORIDE 0.4 MG: 2 INJECTION INTRAMUSCULAR; INTRAVENOUS; SUBCUTANEOUS at 08:40

## 2025-06-02 RX ADMIN — LIDOCAINE HYDROCHLORIDE 50 MG: 20 INJECTION, SOLUTION INTRAVENOUS at 07:40

## 2025-06-02 RX ADMIN — PROPOFOL 50 MG: 10 INJECTION, EMULSION INTRAVENOUS at 08:37

## 2025-06-02 RX ADMIN — ALBUMIN (HUMAN) 12.5 G: 12.5 INJECTION, SOLUTION INTRAVENOUS at 10:06

## 2025-06-02 RX ADMIN — MINERAL OIL AND WHITE PETROLATUM 2 APPLICATOR: 150; 830 OINTMENT OPHTHALMIC at 07:44

## 2025-06-02 RX ADMIN — WATER 2000 MG: 1 INJECTION INTRAMUSCULAR; INTRAVENOUS; SUBCUTANEOUS at 16:21

## 2025-06-02 RX ADMIN — ROCURONIUM BROMIDE 50 MG: 10 INJECTION INTRAVENOUS at 07:41

## 2025-06-02 RX ADMIN — TRANEXAMIC ACID 1000 MG: 100 INJECTION, SOLUTION INTRAVENOUS at 08:10

## 2025-06-02 RX ADMIN — EPHEDRINE SULFATE 5 MG: 5 INJECTION INTRAVENOUS at 07:53

## 2025-06-02 RX ADMIN — SODIUM CHLORIDE, PRESERVATIVE FREE 10 ML: 5 INJECTION INTRAVENOUS at 19:47

## 2025-06-02 ASSESSMENT — PAIN - FUNCTIONAL ASSESSMENT
PAIN_FUNCTIONAL_ASSESSMENT: 0-10
PAIN_FUNCTIONAL_ASSESSMENT: PREVENTS OR INTERFERES SOME ACTIVE ACTIVITIES AND ADLS
PAIN_FUNCTIONAL_ASSESSMENT: PREVENTS OR INTERFERES SOME ACTIVE ACTIVITIES AND ADLS

## 2025-06-02 ASSESSMENT — PAIN DESCRIPTION - PAIN TYPE
TYPE: CHRONIC PAIN;SURGICAL PAIN
TYPE: SURGICAL PAIN

## 2025-06-02 ASSESSMENT — PAIN DESCRIPTION - DESCRIPTORS
DESCRIPTORS: ACHING

## 2025-06-02 ASSESSMENT — PAIN DESCRIPTION - ONSET
ONSET: GRADUAL
ONSET: ON-GOING

## 2025-06-02 ASSESSMENT — PAIN DESCRIPTION - ORIENTATION
ORIENTATION: POSTERIOR
ORIENTATION: RIGHT;LEFT
ORIENTATION: MID

## 2025-06-02 ASSESSMENT — PAIN DESCRIPTION - FREQUENCY
FREQUENCY: CONTINUOUS
FREQUENCY: INTERMITTENT

## 2025-06-02 ASSESSMENT — PAIN SCALES - GENERAL
PAINLEVEL_OUTOF10: 5
PAINLEVEL_OUTOF10: 2
PAINLEVEL_OUTOF10: 5
PAINLEVEL_OUTOF10: 0

## 2025-06-02 ASSESSMENT — PAIN DESCRIPTION - LOCATION
LOCATION: NECK

## 2025-06-02 NOTE — ANESTHESIA POSTPROCEDURE EVALUATION
Department of Anesthesiology  Postprocedure Note    Patient: Pamela Sesay  MRN: [id number]  YOB: 1955  Date of evaluation: 6/2/2025    Procedure Summary       Date: 06/02/25 Room / Location: [facility] OR  / [facility] OR    Anesthesia Start: 0736 Anesthesia Stop: 1112    Procedure: ACDF C3-C4,C4-C5,C5-C6 (Neck) Diagnosis:       Cervical spinal stenosis      Vertebral subluxation complex of cervical region      (Cervical spinal stenosis [M48.02])      (Vertebral subluxation complex of cervical region [M99.11])    Surgeons: Lilibeth Carroll MD Responsible Provider: Jose R Anglin MD    Anesthesia Type: general ASA Status: 3            Anesthesia Type: No value filed.    Sarah Phase I: Sarah Score: 5    Sarah Phase II:      Anesthesia Post Evaluation    Patient location during evaluation: PACU  Patient participation: complete - patient participated  Level of consciousness: responsive to verbal stimuli  Airway patency: patent  Nausea & Vomiting: no vomiting and no nausea  Cardiovascular status: hemodynamically stable  Respiratory status: acceptable and face mask  Hydration status: stable  Pain management: adequate    No notable events documented.

## 2025-06-02 NOTE — ANESTHESIA PRE PROCEDURE
BP Readings from Last 3 Encounters:   06/02/25 (!) 191/84   04/16/25 (!) 155/72   03/03/25 (!) 180/80       NPO Status: Time of last liquid consumption: 0400 (SIP OF WATER WITH MEDICATIONS THIS AM)                        Time of last solid consumption: 1400                        Date of last liquid consumption: 06/02/25                        Date of last solid food consumption: 06/01/25    BMI:   Wt Readings from Last 3 Encounters:   06/02/25 61.5 kg (135 lb 9.6 oz)   04/16/25 61.2 kg (135 lb)   03/03/25 61.2 kg (135 lb)     Body mass index is 24.02 kg/m².    CBC: No results found for: \"WBC\", \"RBC\", \"HGB\", \"HCT\", \"MCV\", \"RDW\", \"PLT\"    CMP: No results found for: \"NA\", \"K\", \"CL\", \"CO2\", \"BUN\", \"CREATININE\", \"GFRAA\", \"AGRATIO\", \"LABGLOM\", \"GLUCOSE\", \"GLU\", \"CALCIUM\", \"BILITOT\", \"ALKPHOS\", \"AST\", \"ALT\"    POC Tests: No results for input(s): \"POCGLU\", \"POCNA\", \"POCK\", \"POCCL\", \"POCBUN\", \"POCHEMO\", \"POCHCT\" in the last 72 hours.    Coags: No results found for: \"PROTIME\", \"INR\", \"APTT\"    HCG (If Applicable): No results found for: \"PREGTESTUR\", \"PREGSERUM\", \"HCG\", \"HCGQUANT\"     ABGs: No results found for: \"PHART\", \"PO2ART\", \"EGS6PXH\", \"ENE6FXD\", \"BEART\", \"I2FPVRBE\"     Type & Screen (If Applicable):  No results found for: \"ABORH\", \"LABANTI\"    Drug/Infectious Status (If Applicable):  No results found for: \"HIV\", \"HEPCAB\"    COVID-19 Screening (If Applicable): No results found for: \"COVID19\"        Anesthesia Evaluation    Airway: Mallampati: II  TM distance: >3 FB   Neck ROM: full  Mouth opening: > = 3 FB   Dental:    (+) edentulous      Pulmonary:   (+)  COPD:      decreased breath sounds    asthma:                            Cardiovascular:    (+) hypertension:, CAD:        Rhythm: regular                      Neuro/Psych:   (+) CVA:            GI/Hepatic/Renal:             Endo/Other:                     Abdominal:             Vascular:          Other Findings:       Anesthesia Plan      general     ASA 3

## 2025-06-02 NOTE — ANESTHESIA PROCEDURE NOTES
Arterial Line:    An arterial line was placed using ultrasound guidance, in the OR for the following indication(s): continuous blood pressure monitoring and blood sampling needed.    A 20 gauge (size), 1 and 3/8 inch (length), Angiocath (type) catheter was placed, Seldinger technique used, into the left radial artery, secured by tape and Tegaderm.  Anesthesia type: General    Events:  patient tolerated procedure well with no complications.6/2/2025 7:52 AM6/2/2025 7:55 AM  Anesthesiologist: Sammy Woodard DO  Performed: Anesthesiologist   Preanesthetic Checklist  Completed: patient identified, IV checked, site marked, risks and benefits discussed, surgical/procedural consents, equipment checked, pre-op evaluation, timeout performed, anesthesia consent given, oxygen available, monitors applied/VS acknowledged, fire risk safety assessment completed and verbalized and blood product R/B/A discussed and consented

## 2025-06-02 NOTE — OP NOTE
[facility]  RECORD OF OPERATION    Patient name: Pamela Sesay  Medical Record Number: [id number]  Account Number: [id number]      Date of Procedure: 6/2/2025    Pre-operative Diagnosis:        1- Cervical spondylosis/degenerative changes at multiple levels especially at the levels of C3-C4, C4-C5 and C5-C6  2- Cervical spine stenosis at the levels of C3-C4 , C4-C5 and C5-C6  3-Cervical spine deformity.  4-Cervical spine the disc disease at the levels C3-C4, C4-C5 and C5-C6  5-Cervical spine foraminal stenosis at the level of C3-C4 , C4-C5 and C5-C6  7- Progressive severe neck pain.  8- Progressive cervical spondylotic radiculopathy.  9-history of right-sided weakness due to previous stroke.     Post-operative Diagnosis: The same     PROCEDURE:      Removal of C3-C4 disk  Fusion of C3-C4  Removal of C4-C5 disk  Fusion of C4-C5  Removal of C5-C6 disk  Fusion of C5-C6  Placement of intervertebral body Rentz(Seaspine) cage  (5 mm X 7 degree ) at C3-C4  Placement of intervertebral body Rentz(Seaspine) cage  (5 mm X 7 degree ) at C4-C5  Placement of intervertebral body Rentz(Seaspine) cage  (6 mm X 7 degree ) at C5-C6  Placement of mini- 3 holes plate (Sea spine, not part of the cage ) at the levels of C3-C4 and secured it by 3 screws (1x14 mm at C3) screws, and (2 x14 at C4).  Placement of mini- 3 holes plate (Sea spine, not part of the cage ) at the levels of C4-C5 and secured it by 3 screws, one (16 mm) in C4 and 2 other (16 mm) in C5.  Placement of mini- 3 holes plate (Sea spine, not part of the cage ) at the levels of C5-C6 and secured it by 3 (14  mm) screws, one in C5 and 2 other in C6.  Placement of biologic graft  Using of operating microscope.  Using of interoperative neurophysiology.     Anesthesia: General endotracheal     Surgeon:  Lilibeth Carroll MD   Assistant: Kenneth Srivastava PA-C      Estimated Blood Loss: Less than 50 ml     Drains: None      Blood Transfusions: None    (14 mm) other in C5 under the assistance of intraoperative fluoroscope.     At the level of C5-C6:        Following that, the disk between C5 and C6 was entered using a 15 bladed knife. The disc and the posterior osteophytes disk were removed using a combination of curettes and pituitary rongeurs. A high speed drill was used to smooth the anterior aspect of C5 and C6.   After I satisfied with the decompression I achieved at that level and opening the posterior longitudinal ligament, introduced a 6 mm trial cage  in the disk space. It fitted well and hence I placed intervertebral body [place](Seaspine) cage (6 mm X 7 degree) at C5-C6). Then I placed a mini- 3 holes plate (Sea spine) at the levels of C5-C6 nd secured it by 3  screws, one (14 mm) in C5 and 2 (14 mm) other in C6 under the assistance of intraoperative fluoroscope.     Then, all  the  [place] pins  were removed. Afterwards we proceeded to copious irrigation and hemostasis. Then we close the platysma and the skin as per routine after placing one MUKUND drain.   The patient tolerated the surgery very well without intraoperative complications.  At the end of the surgery, patient was  transferred  to PACU for further observation and treatment.      *Kenneth Srivastava PA-C ( Neurosurgery PA) assisted throughout the procedure with positioning, draping, retraction, wound closure, and dressing application          Lilibeth Carroll MD, MD  Electronically signed by me on 6/2/2025 at 2:16 PM

## 2025-06-02 NOTE — BRIEF OP NOTE
Brief Postoperative Note      Patient: Pamela Sesay  YOB: 1955  MRN: [id number]    Date of Procedure: 6/2/2025    Pre-Op Diagnosis Codes:      * Cervical spinal stenosis [M48.02]     * Vertebral subluxation complex of cervical region [M99.11]    Post-Op Diagnosis: Same       Procedure(s):  ACDF C3-C4,C4-C5,C5-C6    Surgeon(s):  Lilibeth Carroll MD    Assistant:  Physician Assistant: Kenneth Srivastava PA-C    Anesthesia: General    Estimated Blood Loss (mL): less than 50     Complications: None    Specimens:   * No specimens in log *    Implants:  Implant Name Type Inv. Item Serial No.  Lot No. LRB No. Used Action   GRAFT BNE 5 CC CELLULAR BNE MTRX OSSEOGEN - [id number]  GRAFT BNE 5 CC CELLULAR BNE MTRX OSSEOGEN  BIOLOGICA  N/A 1 Implanted   CAGE SPNL INTBDY 7 DEG [id number] MM STRL SHORELINE ACS - [id number]  CAGE SPNL INTBDY 7 DEG [id number] MM STRL SHORELINE ACS  Zignal Labspine YupiCall [facility] [id number] N/A 1 Implanted   CAGE SPNL INTBDY 7 DEG [id number] MM STRL SHORELINE ACS - [id number]  CAGE SPNL INTBDY 7 DEG [id number] MM STRL SHORELINE ACS  PhotoSynesi [id number] N/A 1 Implanted   CAGE SPNL INTBDY 7 DEG [id number] MM STRL SHORELINE ACS - [id number]  CAGE SPNL INTBDY 7 DEG [id number] MM STRL SHORELINE ACS  Zignal Labspine YupiCall [facility] [id number] N/A 1 Implanted   PLATE SPNL CERV 5 MM ANTR 3 HOLE SHORELINE ACS - [id number]  PLATE SPNL CERV 5 MM ANTR 3 HOLE SHORELINE ACS  SeaSpine [employer]  N/A 2 Implanted   PLATE SPNL CERV 6 MM ANTR 3 HOLE SHORELINE ACS - [id number]  PLATE SPNL CERV 6 MM ANTR 3 HOLE SHORELINE ACS  SeaSpine [employer]  N/A 1 Implanted   SCREW SPNL VA 3.5X14 MM ANTR CERV SD SHORELINE ACS - [id number]  SCREW SPNL VA 3.5X14 MM ANTR CERV SD SHORELINE [place]  SeaSpine [employer]  N/A 9 Implanted   COVER IMPL LCK CERV 5 MM NS SHORELINE ACS - [id number]  COVER IMPL LCK CERV 5 MM NS SHORELINE [facility]  Curalate [facility]  N/A 2 Implanted   COVER IMPL LCK [facility]ERV 6 MM [facility] ACS - [id number]  COVER IMPL  LCK CERV 6 MM NS [facility] [place] [facility]  N/A 1 Implanted         Drains: MUKUND  Closed/Suction Drain Right Neck Bulb (Active)       Findings:  Infection Present At Time Of Surgery (PATOS) (choose all levels that have infection present):  No infection present  Other Findings: Post anterior cervical decompression and fusion of cervical 3 4, cervical 4 5, and cervical 5 6    Electronically signed by Kenneth Srivastava PA-C on 6/2/2025 at 10:52 AM

## 2025-06-02 NOTE — PROGRESS NOTES
1113-  pt to pacu. Pt minimally responsive. Opens eyes briefly to name, does not follow command, OPA remains in place. VSS    1122- pt remains minimally responsive, only briefly opens eyes to name. VSS    1134-pt remains minimally responsive, does keep eyes open, blank stare, does not follow commands, no gag reflex noted, OPA remains in place.VSS    1147- report called to  nurse Aurelia     1153- pt does follow command Pt continues to be slow to respond, C&DB tolerates well.     1206- pt meets criteria for discharge from pacu, transport requested. [facility] to [facility] to send family to IP unit.     1214- pt resting in bed awake. VSS.     1216- Transport arrives to take pt to [facility] in stable condition.

## 2025-06-02 NOTE — PROGRESS NOTES
Patient oriented to Same Day department and admitted to Same Day Surgery room inpatient.   Patient verbalized approval for first name, last initial with physician name on unit whiteboard.     Plan of care reviewed with patient.   Patient room whiteboard filled out and discussed with patient and responsible adult.   Patient and responsible adult offered Same Day Welcome Packet to review.    Call light in reach.   Bed in lowest position, locked, with one bed rail up.   SCDs and warming blanket in place.  Appropriate arm bands on patient.   Bathroom offered.   All questions and concerns of patient addressed.        Meds to Beds:   Patient informed of [facility] Meds to Beds program during admission. Patient is agreeable to program.   Contact information for the pharmacy and the Meds to Beds program:   Name: Johnny    Relationship to patient:spouse/significant other   Phone number: [phone]

## 2025-06-03 VITALS
HEIGHT: 63 IN | BODY MASS INDEX: 24.14 KG/M2 | TEMPERATURE: 97.9 F | HEART RATE: 60 BPM | DIASTOLIC BLOOD PRESSURE: 59 MMHG | WEIGHT: 136.24 LBS | OXYGEN SATURATION: 91 % | SYSTOLIC BLOOD PRESSURE: 138 MMHG | RESPIRATION RATE: 18 BRPM

## 2025-06-03 LAB
ANION GAP SERPL CALC-SCNC: 11 MEQ/L (ref 8–16)
BASOPHILS ABSOLUTE: 0 THOU/MM3 (ref 0–0.1)
BASOPHILS NFR BLD AUTO: 0.2 %
BUN SERPL-MCNC: 19 MG/DL (ref 8–23)
CALCIUM SERPL-MCNC: 8.3 MG/DL (ref 8.8–10.2)
CHLORIDE SERPL-SCNC: 113 MEQ/L (ref 98–111)
CO2 SERPL-SCNC: 20 MEQ/L (ref 22–29)
CREAT SERPL-MCNC: 0.8 MG/DL (ref 0.5–0.9)
DEPRECATED RDW RBC AUTO: 49.6 FL (ref 35–45)
EOSINOPHIL NFR BLD AUTO: 0 %
EOSINOPHILS ABSOLUTE: 0 THOU/MM3 (ref 0–0.4)
ERYTHROCYTE [DISTWIDTH] IN BLOOD BY AUTOMATED COUNT: 13.2 % (ref 11.5–14.5)
GFR SERPL CREATININE-BSD FRML MDRD: 80 ML/MIN/1.73M2
GLUCOSE SERPL-MCNC: 108 MG/DL (ref 74–109)
HCT VFR BLD AUTO: 32.4 % (ref 37–47)
HGB BLD-MCNC: 10.4 GM/DL (ref 12–16)
IMM GRANULOCYTES # BLD AUTO: 0.06 THOU/MM3 (ref 0–0.07)
IMM GRANULOCYTES NFR BLD AUTO: 0.6 %
LYMPHOCYTES ABSOLUTE: 1 THOU/MM3 (ref 1–4.8)
LYMPHOCYTES NFR BLD AUTO: 9.5 %
MCH RBC QN AUTO: 32.9 PG (ref 26–33)
MCHC RBC AUTO-ENTMCNC: 32.1 GM/DL (ref 32.2–35.5)
MCV RBC AUTO: 102.5 FL (ref 81–99)
MONOCYTES ABSOLUTE: 0.6 THOU/MM3 (ref 0.4–1.3)
MONOCYTES NFR BLD AUTO: 6.2 %
NEUTROPHILS ABSOLUTE: 8.4 THOU/MM3 (ref 1.8–7.7)
NEUTROPHILS NFR BLD AUTO: 83.5 %
NRBC BLD AUTO-RTO: 0 /100 WBC
PLATELET # BLD AUTO: 205 THOU/MM3 (ref 130–400)
PMV BLD AUTO: 11.3 FL (ref 9.4–12.4)
POTASSIUM SERPL-SCNC: 4.5 MEQ/L (ref 3.5–5.2)
RBC # BLD AUTO: 3.16 MILL/MM3 (ref 4.2–5.4)
SODIUM SERPL-SCNC: 144 MEQ/L (ref 135–145)
WBC # BLD AUTO: 10 THOU/MM3 (ref 4.8–10.8)

## 2025-06-03 PROCEDURE — 2500000003 HC RX 250 WO HCPCS: Performed by: PHYSICIAN ASSISTANT

## 2025-06-03 PROCEDURE — 6360000002 HC RX W HCPCS: Performed by: PHYSICIAN ASSISTANT

## 2025-06-03 PROCEDURE — 36415 COLL VENOUS BLD VENIPUNCTURE: CPT

## 2025-06-03 PROCEDURE — 85025 COMPLETE CBC W/AUTO DIFF WBC: CPT

## 2025-06-03 PROCEDURE — 97535 SELF CARE MNGMENT TRAINING: CPT

## 2025-06-03 PROCEDURE — 97116 GAIT TRAINING THERAPY: CPT

## 2025-06-03 PROCEDURE — 97110 THERAPEUTIC EXERCISES: CPT

## 2025-06-03 PROCEDURE — 2580000003 HC RX 258: Performed by: NEUROLOGICAL SURGERY

## 2025-06-03 PROCEDURE — 97163 PT EVAL HIGH COMPLEX 45 MIN: CPT

## 2025-06-03 PROCEDURE — 99024 POSTOP FOLLOW-UP VISIT: CPT | Performed by: NEUROLOGICAL SURGERY

## 2025-06-03 PROCEDURE — 80048 BASIC METABOLIC PNL TOTAL CA: CPT

## 2025-06-03 PROCEDURE — 97166 OT EVAL MOD COMPLEX 45 MIN: CPT

## 2025-06-03 PROCEDURE — 6370000000 HC RX 637 (ALT 250 FOR IP): Performed by: PHYSICIAN ASSISTANT

## 2025-06-03 PROCEDURE — APPSS60 APP SPLIT SHARED TIME 46-60 MINUTES: Performed by: PHYSICIAN ASSISTANT

## 2025-06-03 RX ORDER — SODIUM CHLORIDE 9 MG/ML
INJECTION, SOLUTION INTRAVENOUS CONTINUOUS
Status: DISCONTINUED | OUTPATIENT
Start: 2025-06-03 | End: 2025-06-03 | Stop reason: HOSPADM

## 2025-06-03 RX ORDER — HYDROCODONE BITARTRATE AND ACETAMINOPHEN 5; 325 MG/1; MG/1
1 TABLET ORAL EVERY 6 HOURS PRN
Qty: 28 TABLET | Refills: 0 | Status: SHIPPED | OUTPATIENT
Start: 2025-06-03 | End: 2025-06-10

## 2025-06-03 RX ADMIN — WATER 2000 MG: 1 INJECTION INTRAMUSCULAR; INTRAVENOUS; SUBCUTANEOUS at 08:33

## 2025-06-03 RX ADMIN — POTASSIUM CHLORIDE 20 MEQ: 1500 TABLET, EXTENDED RELEASE ORAL at 08:34

## 2025-06-03 RX ADMIN — WATER 2000 MG: 1 INJECTION INTRAMUSCULAR; INTRAVENOUS; SUBCUTANEOUS at 00:21

## 2025-06-03 RX ADMIN — CARVEDILOL 3.12 MG: 3.12 TABLET, FILM COATED ORAL at 08:34

## 2025-06-03 RX ADMIN — SODIUM CHLORIDE: 0.9 INJECTION, SOLUTION INTRAVENOUS at 00:22

## 2025-06-03 RX ADMIN — SODIUM CHLORIDE, PRESERVATIVE FREE 10 ML: 5 INJECTION INTRAVENOUS at 08:34

## 2025-06-03 RX ADMIN — HYDROCODONE BITARTRATE AND ACETAMINOPHEN 1 TABLET: 5; 325 TABLET ORAL at 13:52

## 2025-06-03 RX ADMIN — HYDROCODONE BITARTRATE AND ACETAMINOPHEN 1 TABLET: 5; 325 TABLET ORAL at 06:17

## 2025-06-03 RX ADMIN — SERTRALINE 50 MG: 50 TABLET, FILM COATED ORAL at 08:34

## 2025-06-03 ASSESSMENT — ENCOUNTER SYMPTOMS
APNEA: 0
CHEST TIGHTNESS: 0
SHORTNESS OF BREATH: 0

## 2025-06-03 ASSESSMENT — PAIN SCALES - GENERAL
PAINLEVEL_OUTOF10: 7
PAINLEVEL_OUTOF10: 5
PAINLEVEL_OUTOF10: 3
PAINLEVEL_OUTOF10: 8

## 2025-06-03 ASSESSMENT — PAIN DESCRIPTION - ORIENTATION: ORIENTATION: RIGHT;LEFT

## 2025-06-03 ASSESSMENT — PAIN DESCRIPTION - FREQUENCY: FREQUENCY: CONTINUOUS

## 2025-06-03 ASSESSMENT — PAIN DESCRIPTION - DESCRIPTORS: DESCRIPTORS: ACHING

## 2025-06-03 ASSESSMENT — PAIN DESCRIPTION - PAIN TYPE: TYPE: SURGICAL PAIN

## 2025-06-03 ASSESSMENT — PAIN DESCRIPTION - LOCATION: LOCATION: NECK

## 2025-06-03 ASSESSMENT — PAIN - FUNCTIONAL ASSESSMENT: PAIN_FUNCTIONAL_ASSESSMENT: ACTIVITIES ARE NOT PREVENTED

## 2025-06-03 ASSESSMENT — PAIN DESCRIPTION - ONSET: ONSET: AWAKENED FROM SLEEP

## 2025-06-03 NOTE — PROGRESS NOTES
[facility]  INPATIENT OCCUPATIONAL THERAPY  [facility] CVICU 4B  EVALUATION      Discharge Recommendations: 24 hour supervision or assist, Home with Home health OT  **Pt demo EXTREMELY HIGH FALL RISK, this therapist recommends IP therapy, however Pt is adamant that she wants to go home. Pt's son &  were present during session. This was discussed with them all & the importance of not falling especially after a cervical spine sx. Therapist recommended mobility only with gait belt, RW, & steadying assist. Pt's son &  verbalize understanding    Equipment Recommendations: Yes RW-educated on why not rollator & where to obtain RW as Pt recently had gotten 4 wheeled walker through her insurance       Time In: 1315  Time Out: 1352  Timed Code Treatment Minutes: 27 Minutes  Minutes: 37          Date: 6/3/2025  Patient Name: Pamela Sesay,   Gender: female      MRN: [id number]  : 1955  (70 y.o.)  Referring Practitioner: Kenneth Srivastava PA-C  Diagnosis: Status post cervical spinal fusion  Additional Pertinent Hx: Per MD H & P:  70 y.o.  female, she is an everyday smoker tobacco with 1/2 pack 25-pack-year history who denies smokeless tobacco or vaping product use and denies current alcohol consumption with a medical history that includes anxiety, arthritis, asthma, coronary artery disease requiring anticoagulation with aspirin, prior uterine cancer dating to , cerebral artery occlusion with infarction dating to , COPD, hyperlipidemia and hypertension, nerve pain, and a surgical history that includes prior carpal tunnel release on the left as well as elbow and fracture surgery for the clavicle.  She presented to our service with complaints of worsening neck pain along with left upper extremity weakness significantly limiting activity related to daily living.  She relates frequent falls due to worsening balance with myelopathic symptoms along with associated weakness and muscle  social and functional history, completion of standardized testing, formal and informal observation of tasks, assessment of data and development of plan of care and goals.  Treatment time included skilled education and facilitation of tasks to increase safety and independence with ADL's for improved functional independence and quality of life.    Plan:  Times Per Week: 6x  Current Treatment Recommendations: Balance training, Strengthening, Functional mobility training, Self-Care / ADL, Safety education & training, Endurance training.  See long-term goal time frame for expected duration of plan of care.  If no long-term goals established, a short length of stay is anticipated.    Goals:  Patient goals : go home ASAP  Short Term Goals  Time Frame for Short Term Goals: no LTG set d/t short ELOS  Short Term Goal 1: Pt will complete various t/fs including toilet with S & 0-2 vcs for safety  Short Term Goal 2: Pt will tolerate standing 2-3 min with S for increased ease of sinkside grooming  Short Term Goal 3: Pt will complete mobility to/from bathroom with RW, S, & 0-2 vcs for safety  Short Term Goal 4: Pt will complete UB dressing with min A & adaptive strategies prn  Long Term Goals  Time Frame for Long Term Goals : No LTG set d/t short ELOS    AM-PAC Inpatient Daily Activity Raw Score: 13  AM-PAC Inpatient ADL T-Scale Score : 32.03    Following session, patient left in safe position in bed, with alarm, and call light within reach

## 2025-06-03 NOTE — PROGRESS NOTES
Attending Note:     Patient was seen and examined by me in floor in conjunction with neurosurgery PA/ANP.  Discussed with patient and her nurse as well.  All data and imaging reviewed by myself. I agree with examination assessment and plan as documented below.   Doing well.  No neurological deficit.  Can be discharged and follow-up with neurosurgery outpatient clinic as scheduled.  Lilibeth Carroll MD      Neurosurgery Progress Note    Patient:  Pamela Sesay      Unit/Bed:[facility]/010-A    YOB: 1955    MRN: [id number]     Acct: [id number]     Admit date: 6/2/2025    No chief complaint on file.      Patient Seen, Chart, Physician notes, Labs, Radiology studies reviewed.    Subjective: Patient is seen and evaluated on 4B postoperative day 1 from multilevel anterior cervical decompression and fusion.  Pain is well-controlled this morning.        Past, Family, Social History unchanged from admission.    Diet:  ADULT DIET; Full Liquid    Medications:  Scheduled Meds:   sertraline  50 mg Oral Daily    carvedilol  3.125 mg Oral BID    chlorthalidone  25 mg Oral QHS    potassium chloride  20 mEq Oral Daily    sodium chloride flush  5-40 mL IntraVENous 2 times per day    ceFAZolin  2,000 mg IntraVENous q8h     Continuous Infusions:   sodium chloride 75 mL/hr at 06/03/25 0727    sodium chloride       PRN Meds:nitroGLYCERIN, albuterol sulfate HFA, sodium chloride flush, sodium chloride, HYDROcodone 5 mg - acetaminophen, HYDROmorphone **OR** HYDROmorphone, ketorolac    Objective: Patient is observed lying in bed with head of bed appropriately elevated and [place] collar intact.  [place] collar was removed to facilitate a dressing change of her flat dry incision along with removal of the surgical drain performed at bedside in anticipation of discharge planning to home today with self-care.  She is tolerating a soft and liquid diet in advance of planning.    Vitals: BP (!) 158/71   Pulse 64   Temp 98 °F (36.7

## 2025-06-03 NOTE — DISCHARGE SUMMARY
Physician Discharge Summary     Patient ID:  Pamela Sesay  [id number]  70 y.o.  1955    Admit date: 6/2/2025    Discharge date and time: No discharge date for patient encounter.     Admitting Physician: Lilibeth Carroll MD     Discharge Physician: Lilibeth Carroll MD     Admission Diagnoses: Cervical spinal stenosis [M48.02]  Vertebral subluxation complex of cervical region [M99.11]  Status post cervical spinal fusion [Z98.1]    Discharge Diagnoses: Post anterior cervical decompression and fusion addressing cervical spinal stenosis    Admission Condition: good    Discharged Condition: good    Indication for Admission: Scheduled anterior cervical decompression and fusion to address cervical spinal stenosis from C3-C6    Hospital Course: Patient required an overnight stay following a procedure to address cervical spinal stenosis.  She tolerated anterior cervical decompression and fusion of cervical 3 4, cervical 5, cervical 5 6, as performed Dr. Carroll/neurosurgeon, without complication.  She is resting comfortably with pain well-controlled.  [place] collar was removed to facilitate a dressing change exposing Steri-Strips over a flat dry incision.  Surgical drain was discontinued and removed at bedside this morning in anticipation of discharge planning to home with self-care with a follow-up with neurosurgery indicated at 14 days postop for incision inspection.    Consults: none    Significant Diagnostic Studies: none    Treatments: surgery: Anterior cervical decompression and fusion of cervical 3 4, cervical 4 5, cervical 5 6    Discharge Exam:  BP (!) 158/71   Pulse 64   Temp 98 °F (36.7 °C) (Oral)   Resp 15   Ht 1.6 m (5' 3\")   Wt 61.8 kg (136 lb 3.9 oz)   SpO2 98%   BMI 24.13 kg/m²     General Appearance:    Alert, cooperative, no distress, appears stated age   Head:    Normocephalic, without obvious abnormality, atraumatic   Eyes:    PERRL, conjunctiva/corneas clear, EOM's intact, fundi     benign, both  sulfate HFA (VENTOLIN HFA) 108 (90 Base) MCG/ACT inhaler Inhale 2 puffs into the lungs every 6 hours as needed for Wheezing (COPD)      Calcium-Magnesium-Zinc 500-250-12.5 MG TABS Take 1 tablet by mouth Daily      potassium chloride (KLOR-CON) 20 MEQ packet Take 20 mEq by mouth daily      sertraline (ZOLOFT) 50 MG tablet Take 1 tablet by mouth daily      carvedilol (COREG) 3.125 MG tablet Take 1 tablet by mouth 2 times daily Indications: Hold if Heart rate less than 55      chlorthalidone (HYGROTON) 25 MG tablet Take 1 tablet by mouth nightly      nitroGLYCERIN (NITROSTAT) 0.4 MG SL tablet Place 1 tablet under the tongue           STOP taking these medications       aspirin 81 MG chewable tablet Comments:   Reason for Stopping:         atorvastatin (LIPITOR) 80 MG tablet Comments:   Reason for Stopping:             Activity: activity as tolerated and no driving for today  Diet: regular diet  Wound Care: keep wound clean and dry    Follow-up with neurosurgery in 2 weeks.    Signed:  Kenneth Srivastava [facility], KRISTINA  6/3/2025  7:44 AM

## 2025-06-03 NOTE — PROGRESS NOTES
[facility]  INPATIENT PHYSICAL THERAPY  EVALUATION  [facility] CVICU 4B - 4B-10/010-A    Discharge Recommendations: Continue to assess pending progress (pt is not safe to return home after PT eval with L hand weakness noted and RN aware.)  Equipment Recommendations: No               Time In: 0933  Time Out: 0953  Timed Code Treatment Minutes: 8 Minutes  Minutes: 20          Date: 6/3/2025  Patient Name: Pamela Sesay,  Gender:  female        MRN: [id number]  : 1955  (70 y.o.)      Referring Practitioner: DEJA Mccollum  Diagnosis: Status post cervical spinal fusion  Additional Pertinent Hx: 70-year-old female who has a history of a progressive severe neck pain started .  Patient symptoms have been progressively gotten worse with time.  Currently patient has a pain that up to 8-9/10.  The pain goes to her left arm all the way down to the fingers.  The pain increases with neck movements  Patient has right-sided weakness in her baseline from previous stroke about 4-/5. s/p ACDF C3-C4, C4-C5, C5-C6 on 25.     Restrictions/Precautions:  Restrictions/Precautions: Fall Risk       Spinal Precautions Comments: cervical spine precautions  Other Position/Activity Restrictions: [place], cervical precautions, per DEJA Mccollum note pt to wear [place] collar when sleeping and ambulating    Required Braces or Orthoses?: Yes  Cervical: c-collar      Subjective:  Chart Reviewed: Yes  Patient assessed for rehabilitation services?: Yes  Subjective: pleasant and cooperative, pt c/o dressing to left wrist was too tight and with inc mobility pt with dec strength and edema present, RN made aware and when cut off pressure dressing with inc sensation and stated improved movement and feeling better already at end of session.    General:        Hearing: Within functional limits       Pain: 3/10: neck per pt    Vitals: Vitals not assessed per clinical judgement, see nursing flowsheet    Social/Functional History:     Lives With: Spouse (and grandson)  Type of Home: House  Home Layout: Two level, Able to Live on Main level with bedroom/bathroom  Home Access: Ramped entrance (with HR)  Home Equipment: Cane, Walker - 4-Wheeled, Wheelchair - Manual     Bathroom Shower/Tub: Tub/Shower unit  Bathroom Toilet: Standard  Bathroom Equipment: Shower chair, Grab bars around toilet, Grab bars in shower       Prior Level of Assist for ADLs: Needs assistance (some help putting on sweatshirt sometimes)     Prior Level of Assist for Transfers: Independent  Prior Level of Assist for Ambulation: Independent household ambulator, with or without device    Active : Yes     Additional Comments: Pt reports using no AD at home prior to sx. rollator only occassionally in community.    OBJECTIVE:  Range of Motion:  Bilateral Lower Extremity: WFL    Strength:  Right Lower Extremity: Impaired - grossly 4-/5 with residual weakness with hx of CVA  Left Lower Extremity: WFL    Balance:  Static Sitting Balance:  Stand By Assistance, X 1, EOB  Static Standing Balance: Minimal Assistance, to CGAx1 with RW, pt difficulty grasping left hand on walker and maintaining hold, initially with LOB that required [place] to correct.    Bed Mobility:  Rolling to Right: Minimal Assistance, X 1, with head of bed raised, with rail, with verbal cues , with increased time for completion   Supine to Sit: Minimal Assistance, X 1, with head of bed raised, with rail, with verbal cues , with increased time for completion  Scooting: Contact Guard Assistance, X 1  Cues to maintain cervical precautions  Transfers:  Sit to Stand: Minimal Assistance, X 1, pt unsteady with difficulty with LUE support on walker  Stand to Sit:Contact Guard Assistance, X 1    Ambulation:  Minimal Assistance, to CGAx1  Distance: 3'x1, 40'x1  Surface: Level Tile  Device: Rolling Walker  Gait Deviations: Forward Flexed Posture, Slow [place], Mild Path Deviations, and min unsteady, Left hand weakness and

## 2025-06-03 NOTE — PLAN OF CARE
Problem: Chronic Conditions and Co-morbidities  Goal: Patient's chronic conditions and co-morbidity symptoms are monitored and maintained or improved  6/2/2025 2219 by Mercedez Schaeffer RN  Outcome: Progressing  Flowsheets (Taken 6/2/2025 0610 by Annmarie Hernandez, RN)  Care Plan - Patient's Chronic Conditions and Co-Morbidity Symptoms are Monitored and Maintained or Improved: Collaborate with multidisciplinary team to address chronic and comorbid conditions and prevent exacerbation or deterioration  6/2/2025 1628 by Jerome Ortega RN  Outcome: Progressing  Flowsheets (Taken 6/2/2025 0610 by Annmarie Hernandez, RN)  Care Plan - Patient's Chronic Conditions and Co-Morbidity Symptoms are Monitored and Maintained or Improved: Collaborate with multidisciplinary team to address chronic and comorbid conditions and prevent exacerbation or deterioration     Problem: Discharge Planning  Goal: Discharge to home or other facility with appropriate resources  6/2/2025 2219 by Mercedez Schaeffer RN  Outcome: Progressing  Flowsheets (Taken 6/2/2025 2219)  Discharge to home or other facility with appropriate resources:   Identify barriers to discharge with patient and caregiver   Identify discharge learning needs (meds, wound care, etc)   Refer to discharge planning if patient needs post-hospital services based on physician order or complex needs related to functional status, cognitive ability or social support system   Arrange for needed discharge resources and transportation as appropriate   Arrange for interpreters to assist at discharge as needed  6/2/2025 1628 by Jerome Ortega, RN  Outcome: Progressing  Flowsheets (Taken 6/2/2025 0610 by Annmarie Hernandez, RN)  Discharge to home or other facility with appropriate resources: Identify discharge learning needs (meds, wound care, etc)     Problem: Pain  Goal: Verbalizes/displays adequate comfort level or baseline comfort level  6/2/2025 2219 by Mercedez Schaeffer  devices

## 2025-06-03 NOTE — DISCHARGE INSTRUCTIONS
Spine Discharge Instructions for Patients  [phone]      Postoperative Pain: It is not uncommon to have the following symptoms after spine surgery:   Pain at or around the incision site   Leg or arm pain similar to before surgery   Mild numbness or tingling in the legs or arms   Mild redness, swelling or clear/pinkish drainage at the incision site   Muscle spasms in your neck    Your symptoms should improve gradually, and you should feel a little better every day.      Medication:    You will be prescribed pain medication to begin taking at home. Examples of these medications include: Percocet (oxycodone), Darvocet (proposyphene), Vicodin/Norco (hydrocodone).    Sometimes, a medicine that helps to relax muscles and prevent spasms is also prescribed for use in conjunction with the pain medicine. Examples of these include Valium (diazepam), Flexeril (cyclobenzaprine) and Robaxin (methocarbamol).   Continue to take medications every 4-6 hours as directed until your pain is well controlled.    Do not take anti-inflammatory medicines such as Advil, Motrin, Aleve, Celebrex (celecoxib) or ibuprofen until instructed you that it is safe to do so. These medicines can interfere with the healing process of your spine.     Braces: You may be given a neck brace to protect your spine while it heals. You should wear this brace at all times, except for when bathing and dressing. Neurosurgery will let you know when you can discontinue use of the brace, usually in 2 weeks    Taking Care of Your Incision: steri strips   You may shower beginning 4 days after surgery. After showering, you should thoroughly pat the incision dry. Do not soak the incision. Avoid lotion around incision site.    Leave the incision uncovered during the day. If clothing irritates the site, cover with a small gauze pad. Avoid large dressings that keep the incision site moist.    Signs of infection include increased pain, pus-like drainage, redness or warmth

## 2025-06-03 NOTE — CARE COORDINATION
6/3/25, 9:29 AM EDT    Patient goals/plan/ treatment preferences discussed by  and .  Patient goals/plan/ treatment preferences reviewed with patient/ family.  Patient/ family verbalize understanding of discharge plan and are in agreement with goal/plan/treatment preferences.  Understanding was demonstrated using the teach back method.  AVS provided by RN at time of discharge, which includes all necessary medical information pertaining to the patients current course of illness, treatment, post-discharge goals of care, and treatment preferences. Spoke with Pamela, she plans to return home with . She does not use dme and does not feel services/dme are needed. Pt verbalized understanding and gave permission for possible discharge within 4 hours of receiving IMM.      Services At/After Discharge: None          06/03/25 1126   Service Assessment   Patient Orientation Alert and Oriented   Cognition Alert   History Provided By Patient   Primary Caregiver Self   Support Systems Spouse/Significant Other   Patient's Healthcare Decision Maker is: Legal Next of Kin   PCP Verified by CM Yes   Last Visit to PCP Within last year   Prior Functional Level Independent in ADLs/IADLs   Current Functional Level Assistance with the following:;Housework;Shopping   Can patient return to prior living arrangement Yes   Ability to make needs known: Good   Family able to assist with home care needs: Yes   Would you like for me to discuss the discharge plan with any other family members/significant others, and if so, who? No   Financial Resources Medicare;Medicaid   Community Resources None   Social/Functional History   Active  Yes   Discharge Planning   Type of Residence House   Living Arrangements Spouse/Significant Other   Current Services Prior To Admission None   Potential Assistance Needed N/A   DME Ordered? No   Potential Assistance Purchasing Medications No   Type of Home Care Services None    Patient expects to be discharged to: House   Services At/After Discharge   Transition of Care Consult (CM Consult) Discharge Planning   Services At/After Discharge None   Mode of Transport at Discharge Other (see comment)  ()   Confirm Follow Up Transport Family   Condition of Participation: Discharge Planning   The Plan for Transition of Care is related to the following treatment goals: go home today

## 2025-06-04 DIAGNOSIS — F17.200 TOBACCO DEPENDENCE: ICD-10-CM

## 2025-06-04 DIAGNOSIS — Z98.1 STATUS POST CERVICAL SPINAL FUSION: ICD-10-CM

## 2025-06-04 DIAGNOSIS — M48.02 STENOSIS OF CERVICAL SPINE WITH MYELOPATHY (HCC): Primary | ICD-10-CM

## 2025-06-04 DIAGNOSIS — G99.2 STENOSIS OF CERVICAL SPINE WITH MYELOPATHY (HCC): Primary | ICD-10-CM

## 2025-06-11 ENCOUNTER — TELEPHONE (OUTPATIENT)
Dept: NEUROSURGERY | Age: 70
End: 2025-06-11

## 2025-06-11 NOTE — TELEPHONE ENCOUNTER
Spoke with a rep from [employer], once claim is submitted an appeal can be initiated for CPT code 72325. Call Ref # [id number]. KL

## 2025-06-19 ENCOUNTER — OFFICE VISIT (OUTPATIENT)
Dept: NEUROSURGERY | Age: 70
End: 2025-06-19

## 2025-06-19 VITALS
BODY MASS INDEX: 23.04 KG/M2 | DIASTOLIC BLOOD PRESSURE: 64 MMHG | HEART RATE: 58 BPM | WEIGHT: 130 LBS | SYSTOLIC BLOOD PRESSURE: 130 MMHG | HEIGHT: 63 IN

## 2025-06-19 DIAGNOSIS — F17.200 TOBACCO DEPENDENCE: ICD-10-CM

## 2025-06-19 DIAGNOSIS — Z98.1 STATUS POST CERVICAL SPINAL FUSION: Primary | ICD-10-CM

## 2025-06-19 PROCEDURE — 99024 POSTOP FOLLOW-UP VISIT: CPT | Performed by: NURSE PRACTITIONER

## 2025-06-19 ASSESSMENT — ENCOUNTER SYMPTOMS
WHEEZING: 0
VOICE CHANGE: 0
TROUBLE SWALLOWING: 0
NAUSEA: 0
CHEST TIGHTNESS: 0
BACK PAIN: 0
VOMITING: 0
PHOTOPHOBIA: 0
CHOKING: 0
ABDOMINAL DISTENTION: 0
COLOR CHANGE: 0
SHORTNESS OF BREATH: 0
COUGH: 0

## 2025-06-19 NOTE — PROGRESS NOTES
[facility] PROFESSIONAL [facility] NEUROSURGERY DEPARTMENT  [address] [place] [address]  [address]  [place] [id number]  Dept: [phone]  Dept Fax: [phone]      Patient Name:  Pamela Sesay  Visit Date:  6/19/2025    HPI:     Ms. Sesay is a 70 y.o. female in which she is a half a pack a day smoker and working in acknowledging smoking sensation to begin on 3/3/2025, she also does have a significant history of recent stroke related to CVA on 8/12/2023 and primarily left-sided weakness which has ultimately resolved aside from her cervical stenosis. That presents today at [facility] Neurosurgery for evaluation of the following: Pamela is a 69-year-old female who presents to the office with ongoing symptoms of cervical myelopathy with increasing balance difficulties along with dexterity and coordination changes.  She feels significant limited with her functional ability with completing activities of daily living.  She also notes feeling as though she falls due to her balance difficulties which is a significant concern to her on a day-to-day basis and feels ultimately limited with her mobility.  In addition to her myelopathic symptoms she does note radiating pain particularly in her bilateral shoulders right greater than left with associated weakness, with newer symptoms of muscle spasming in her left upper arm.  She did undergo a MRI of the cervical spine back in June 2024 which did show evidence of multilevel cervical stenosis with ventral cord contact to the levels of C3-C4, C4-C5 and C5-C6 with subluxation antegrade of C4 relative to C5 and C5 relative to C6.  She notes her symptoms have progressively worsened and involving more of her tricep and deltoid weakness and her bilateral shoulder range of motion since June 2024.  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  Her symptoms are consistent with findings on a previous MRI of

## (undated) DEVICE — PACK-MAJOR

## (undated) DEVICE — AGENT HEMOSTATIC SURGIFLOW MATRIX KIT W/THROMBIN

## (undated) DEVICE — GLOVE SURG SZ 65 THK91MIL LTX FREE SYN POLYISOPRENE

## (undated) DEVICE — STRIP,CLOSURE,WOUND,MEDI-STRIP,1/2X4: Brand: MEDLINE

## (undated) DEVICE — CARBIDE MATCH HEAD

## (undated) DEVICE — BAG,BANDED,W/RUBBERBAND,STERILE,30X36: Brand: MEDLINE

## (undated) DEVICE — OIL CARTRIDGE: Brand: CORE, MAESTRO

## (undated) DEVICE — COLLAR CERV UNIV AD L13-19IN TRACH OPN TWO PC RIG POLYETH

## (undated) DEVICE — SPONGE,PEANUT,XRAY,ST,SM,3/8",5/CARD: Brand: MEDLINE INDUSTRIES, INC.

## (undated) DEVICE — EVACUATOR SURG 100CC SIL BLB SUCT RESVR FOR CLS WND DRNGE

## (undated) DEVICE — C-ARMOR C-ARM EQUIPMENT COVERS CLEAR STERILE UNIVERSAL FIT 12 PER CASE: Brand: C-ARMOR

## (undated) DEVICE — DIFFUSER: Brand: CORE, MAESTRO

## (undated) DEVICE — SUTURE PERMA-HAND SZ 2-0 L30IN NONABSORBABLE BLK L26MM SH K833H

## (undated) DEVICE — AGENT HEMSTAT W2XL4IN OXIDIZED REGENERATED CELOS ABSRB SFT

## (undated) DEVICE — Device

## (undated) DEVICE — GOWN,SIRUS,NON REINFRCD,LARGE,SET IN SL: Brand: MEDLINE

## (undated) DEVICE — TUBING, SUCTION, 1/4" X 20', STRAIGHT: Brand: MEDLINE INDUSTRIES, INC.

## (undated) DEVICE — DRAIN SURG 10FR PVC TB W/ TRCR MID PERF NO RESVR HUBLESS

## (undated) DEVICE — DRAIN SURG FLAT W7MMXL20CM FULL PERF

## (undated) DEVICE — SYRINGE MED 30ML STD CLR PLAS LUERLOCK TIP N CTRL DISP

## (undated) DEVICE — DR SALMA'S SPINE: Brand: MEDLINE INDUSTRIES, INC.